# Patient Record
Sex: MALE | Race: WHITE | URBAN - METROPOLITAN AREA
[De-identification: names, ages, dates, MRNs, and addresses within clinical notes are randomized per-mention and may not be internally consistent; named-entity substitution may affect disease eponyms.]

---

## 2017-05-23 ENCOUNTER — OFFICE VISIT (OUTPATIENT)
Dept: INTERNAL MEDICINE CLINIC | Age: 72
End: 2017-05-23

## 2017-05-23 VITALS
WEIGHT: 239.8 LBS | HEIGHT: 69 IN | BODY MASS INDEX: 35.52 KG/M2 | TEMPERATURE: 97.7 F | SYSTOLIC BLOOD PRESSURE: 118 MMHG | RESPIRATION RATE: 18 BRPM | HEART RATE: 68 BPM | DIASTOLIC BLOOD PRESSURE: 71 MMHG | OXYGEN SATURATION: 96 %

## 2017-05-23 DIAGNOSIS — Z13.39 SCREENING FOR ALCOHOLISM: ICD-10-CM

## 2017-05-23 DIAGNOSIS — Z12.11 SCREEN FOR COLON CANCER: ICD-10-CM

## 2017-05-23 DIAGNOSIS — Z00.00 ROUTINE GENERAL MEDICAL EXAMINATION AT A HEALTH CARE FACILITY: Primary | ICD-10-CM

## 2017-05-23 DIAGNOSIS — G47.30 SLEEP APNEA, UNSPECIFIED TYPE: ICD-10-CM

## 2017-05-23 DIAGNOSIS — I10 ESSENTIAL HYPERTENSION: Chronic | ICD-10-CM

## 2017-05-23 DIAGNOSIS — R40.0 DAYTIME SOMNOLENCE: ICD-10-CM

## 2017-05-23 DIAGNOSIS — E78.00 HYPERCHOLESTEREMIA: Chronic | ICD-10-CM

## 2017-05-23 PROBLEM — Z71.89 ADVANCE DIRECTIVE DISCUSSED WITH PATIENT: Status: ACTIVE | Noted: 2017-05-23

## 2017-05-23 NOTE — MR AVS SNAPSHOT
Visit Information Date & Time Provider Department Dept. Phone Encounter #  
 5/23/2017  4:00 PM [address] [phone] [id number] Follow-up Instructions Return in about 3 months (around 8/23/2017) for annual CPE, cholesterol, htn. Upcoming Health Maintenance Date Due  
 GLAUCOMA SCREENING Q2Y 5/22/2012 COLONOSCOPY 10/13/2016 MEDICARE YEARLY EXAM 4/7/2017 DTaP/Tdap/Td series (1 - Tdap) 10/24/2018* INFLUENZA AGE 9 TO ADULT 8/1/2017 *Topic was postponed. The date shown is not the original due date. Allergies as of 5/23/2017  Review Complete On: 5/23/2017 By: Paola Nguyen MD  
 No Known Allergies Current Immunizations  Reviewed on 10/3/2016 Name Date Influenza High Dose Vaccine PF 10/3/2016  5:20 PM, 12/21/2014 Influenza Vaccine 2/5/2014  4:48 PM  
 Influenza Vaccine Split 10/9/2012 Pneumococcal Conjugate (PCV-13) 10/3/2016  5:21 PM  
 Pneumococcal Polysaccharide (PPSV-23) 2/5/2014  4:39 PM  
 Td 7/5/2015 Zoster Vaccine, Live 12/22/2014 Not reviewed this visit You Were Diagnosed With   
  
 Codes Comments Routine general medical examination at a health care facility    -  Primary ICD-10-CM: Z00.00 ICD-9-CM: V70.0 Essential hypertension     ICD-10-CM: I10 
ICD-9-CM: 401.9 Hypercholesteremia     ICD-10-CM: E78.00 ICD-9-CM: 272.0 Sleep apnea, unspecified type     ICD-10-CM: G47.30 ICD-9-CM: 780.57 Daytime somnolence     ICD-10-CM: R40.0 ICD-9-CM: 780.54 Screen for colon cancer     ICD-10-CM: Z12.11 ICD-9-CM: V76.51 Screening for alcoholism     ICD-10-CM: Z13.89 ICD-9-CM: V79.1 Vitals BP Pulse Temp Resp Height(growth percentile) Weight(growth percentile) 118/71 (BP 1 Location: Right arm, BP Patient Position: Sitting) 68 97.7 °F (36.5 °C) (Oral) 18 5' 9\" (1.753 m) 239 lb 12.8 oz (108.8 kg) SpO2 BMI Smoking Status 96% 35.41 kg/m2 Former Smoker Vitals History BMI and BSA Data Body Mass Index Body Surface Area  
 35.41 kg/m 2 2.3 m 2 Preferred Pharmacy Pharmacy Name Phone [facility] [address], [address] Your Updated Medication List  
  
   
This list is accurate as of: 5/23/17  4:53 PM.  Always use your most recent med list.  
  
  
  
  
 acetaminophen 325 mg tablet Commonly known as:  TYLENOL  
650 mg.  
  
 aspirin delayed-release 81 mg tablet Take  by mouth daily. escitalopram oxalate 20 mg tablet Commonly known as:  Oletha Dubs Take 1 Tab by mouth daily. metoprolol tartrate 50 mg tablet Commonly known as:  LOPRESSOR Take 1 Tab by mouth two (2) times a day. Indications: MYOCARDIAL INFARCTION  
  
 NITROSTAT 0.4 mg SL tablet Generic drug:  nitroglycerin  
  
 raNITIdine 150 mg tablet Commonly known as:  ZANTAC Take 150 mg by mouth as needed for Indigestion. rosuvastatin 10 mg tablet Commonly known as:  CRESTOR  
TAKE 1 TABLET DAILY  
  
 tadalafil 20 mg tablet Commonly known as:  CIALIS Take 1 tablet by mouth as needed. temazepam 15 mg capsule Commonly known as:  RESTORIL 15 mg.  
  
 topiramate 50 mg tablet Commonly known as:  TOPAMAX Take 1 Tab by mouth two (2) times a day. ZETIA 10 mg tablet Generic drug:  ezetimibe TAKE 1 TABLET DAILY We Performed the Following REFERRAL TO GASTROENTEROLOGY [UFT11 Custom] Comments:  
 Due for follow-up colonoscopy REFERRAL TO SLEEP STUDIES [REF99 Custom] Comments:  
 [facility] [address] [place] [address] Follow-up Instructions Return in about 3 months (around 8/23/2017) for annual CPE, cholesterol, htn. To-Do List   
 05/23/2017 Lab:  LIPID PANEL   
  
 05/23/2017 Lab:  METABOLIC PANEL, COMPREHENSIVE Referral Information Referral ID Referred By Referred To [id number] Jeremy Mack 3. Not Available Visits Status Start Date End Date 1 New Request 5/23/17 5/23/18 If your referral has a status of pending review or denied, additional information will be sent to support the outcome of this decision. Referral ID Referred By Referred To  
 [id number] MD [address]  
   [address] [place] [address] Phone: [phone] Fax: [phone] Visits Status Start Date End Date 1 New Request 5/23/17 5/23/18 If your referral has a status of pending review or denied, additional information will be sent to support the outcome of this decision. Patient Instructions Schedule of Personalized Health Plan (Provide Copy to Patient) The best way to stay healthy is to live a healthy lifestyle. A healthy lifestyle includes regular exercise, eating a well-balanced diet, keeping a healthy weight and not smoking. Regular physical exams and screening tests are another important way to take care of yourself. Preventive exams provided by health care providers can find health problems early when treatment works best and can keep you from getting certain diseases or illnesses. Preventive services include exams, lab tests, screenings, shots, monitoring and information to help you take care of your own health. All people over 65 should have a pneumonia shot. Pneumonia shots are usually only needed once in a lifetime unless your doctor decides differently. All people over 65 should have a yearly flu shot. People over 65 are at medium to high risk for Hepatitis B. Three shots are needed for complete protection. In addition to your physical exam, some screening tests are recommended: 
 
Bone mass measurement (dexa scan) is recommended every two years if you have certain risk factors, such as personal history of vertebral fracture or chronic steroid medication use Diabetes Mellitus screening is recommended every year. Glaucoma is an eye disease caused by high pressure in the eye. An eye exam is recommended every year. Cardiovascular screening tests that check your cholesterol and other blood fat (lipid) levels are recommended every five years. Colorectal Cancer screening tests help to find pre-cancerous polyps (growths in the colon) so they can be removed before they turn into cancer. Tests ordered for screening depend on your personal and family history risk factors. Screening for Prostate Cancer is recommended yearly with a digital rectal exam and/or a PSA test 
 
Here is a list of your current Health Maintenance items with a due date: 
Health Maintenance Topic Date Due  GLAUCOMA SCREENING Q2Y  05/22/2012  COLONOSCOPY  10/13/2016  MEDICARE YEARLY EXAM  04/07/2017  
 DTaP/Tdap/Td series (1 - Tdap) 10/24/2018 (Originally 7/6/2015)  INFLUENZA AGE 9 TO ADULT  08/01/2017  Hepatitis C Screening  Completed  ZOSTER VACCINE AGE 60>  Completed  Pneumococcal 65+ Low/Medium Risk  Completed [facility]! Dear Belinda Lambert: Thank you for requesting a 1CLICK account. Our records indicate that you already have an active 1CLICK account. You can access your account anytime at [email or web address] Did you know that you can access your hospital and ER discharge instructions at any time in 1CLICK? You can also review all of your test results from your hospital stay or ER visit. Additional Information If you have questions, please visit the Frequently Asked Questions section of the 1CLICK website at [email or web address] Remember, 1CLICK is NOT to be used for urgent needs. For medical emergencies, dial 911. Now available from your iPhone and Android! Please provide this summary of care documentation to your next provider. Your primary care clinician is listed as [facility]. If you have any questions after today's visit, please call [phone].

## 2017-05-23 NOTE — PROGRESS NOTES
HISTORY OF PRESENT ILLNESS  Hilbert Koyanagi is a 81 y.o. male. Visit Vitals    /71 (BP 1 Location: Right arm, BP Patient Position: Sitting)    Pulse 68    Temp 97.7 °F (36.5 °C) (Oral)    Resp 18    Ht 5' 9\" (1.753 m)    Wt 239 lb 12.8 oz (108.8 kg)    SpO2 96%    BMI 35.41 kg/m2       HPI Comments: Cardiology thought he may have had an anginal attack recently. He had an Xray but not a stress test. Pt feels it was not angina. Chest Pain (Angina)    The history is provided by the patient (see comments). This is a new problem. The problem has been resolved. The problem occurs rarely. The pain is associated with normal activity. Pertinent negatives include no orthopnea and no palpitations. Procedural history includes cardiac catheterization. Depression   The history is provided by the patient. This is a chronic problem. The current episode started more than 1 week ago. The problem occurs daily. The problem has been gradually improving. The symptoms are relieved by medications. Treatments tried: citalapram. The treatment provided moderate relief. Review of Systems   Constitutional: Negative. Cardiovascular: Negative for palpitations and orthopnea. Psychiatric/Behavioral: Positive for depression. Physical Exam   Constitutional: He is oriented to person, place, and time. He appears well-developed and well-nourished. No distress. Cardiovascular: Normal rate and regular rhythm. Pulmonary/Chest: Effort normal and breath sounds normal.   Musculoskeletal: He exhibits no edema. Neurological: He is alert and oriented to person, place, and time. Skin: Skin is warm and dry. He is not diaphoretic. Psychiatric: He has a normal mood and affect. His behavior is normal. Judgment and thought content normal.   H/o depression sxs. States he is feeling well at this point. We did discuss tapering off meds. He opts to stay where he is. Nursing note and vitals reviewed.       ASSESSMENT and PLAN ICD-10-CM ICD-9-CM                   Essential hypertension K01 254.2 METABOLIC PANEL, COMPREHENSIVE    Hypercholesteremia E78.00 272.0 LIPID PANEL    Sleep apnea, unspecified type G47.30 780.57 REFERRAL TO SLEEP STUDIES    Daytime somnolence R40.0 780.54 REFERRAL TO SLEEP STUDIES    Screen for colon cancer Z12.11 V76.51 REFERRAL TO GASTROENTEROLOGY       BP looks good    Needs updated sleep study    Due for colonoscopy update    Discussed weight, lifestyle, diet and exercise    F/u summer for annual CPE                  The following is a separate encounter visit:      This is an Initial Medicare Annual Wellness Exam (AWV) (Performed 12 months after IPPE or effective date of Medicare Part B enrollment, Once in a lifetime)    I have reviewed the patient's medical history in detail and updated the computerized patient record. History     Past Medical History:   Diagnosis Date    Anxiety     ASCVD (arteriosclerotic cardiovascular disease)     Benign essential tremor     BPH (benign prostatic hyperplasia)     Depression     Gross hematuria     Hypercholesterolemia     Hypertension     MI (myocardial infarction) ([address] 2005    Sleep apnea       Past Surgical History:   Procedure Laterality Date    HX COLONOSCOPY  10/17/11    5 yr f/u, [facility]  5/24/16    LIMA to LAD, SVG OM-LPLV, 3 v total    HX CORONARY STENT PLACEMENT  2005    X2 , Dr. Regina Falk  2005    HX UROLOGICAL  3/28/13    Cystoscopy, Retrograde Pyelogram, Bladder Biopsy     HX VASECTOMY       Current Outpatient Prescriptions   Medication Sig Dispense Refill    topiramate (TOPAMAX) 50 mg tablet Take 1 Tab by mouth two (2) times a day. 180 Tab 5    ZETIA 10 mg tablet TAKE 1 TABLET DAILY 90 Tab 3    rosuvastatin (CRESTOR) 10 mg tablet TAKE 1 TABLET DAILY 90 Tab 3    escitalopram oxalate (LEXAPRO) 20 mg tablet Take 1 Tab by mouth daily.  90 Tab 5    metoprolol tartrate (LOPRESSOR) 50 mg tablet Take 1 Tab by mouth two (2) times a day. Indications: MYOCARDIAL INFARCTION 180 Tab 5    NITROSTAT 0.4 mg SL tablet       acetaminophen (TYLENOL) 325 mg tablet 650 mg.      ranitidine (ZANTAC) 150 mg tablet Take 150 mg by mouth as needed for Indigestion.  tadalafil (CIALIS) 20 mg tablet Take 1 tablet by mouth as needed. 8 tablet 5    aspirin delayed-release 81 mg tablet Take  by mouth daily.  temazepam (RESTORIL) 15 mg capsule 15 mg. No Known Allergies  Family History   Problem Relation Age of Onset    Cancer Mother      Oral    Cancer Maternal Grandfather      Renal Cell    Colon Cancer Paternal Aunt      Social History   Substance Use Topics    Smoking status: Former Smoker     Packs/day: 1.00     Years: 28.00     Types: Cigarettes     Quit date: 1/1/1989    Smokeless tobacco: Never Used    Alcohol use 7.0 oz/week     14 Glasses of wine per week     Patient Active Problem List   Diagnosis Code    Prostate enlargement N40.0    Hypercholesteremia E78.00    Depression F32.9    Gross hematuria R31.0    Hypertrophy of prostate with urinary obstruction and other lower urinary tract symptoms (LUTS) N40.1    Dysuria R30.0    Essential hypertension I10    Coronary artery disease involving native heart without angina pectoris I25.10    Advance directive discussed with patient Z71.89         Depression Risk Factor Screening:   No flowsheet data found. Alcohol Risk Factor Screening: On any occasion during the past 3 months, have you had more than 4 drinks containing alcohol? Yes    Do you average more than 14 drinks per week? Yes    Functional Ability and Level of Safety:     Hearing Loss   moderate, sensorineural, bilat. Wears bilat hearing aids    Activities of Daily Living   Self-care. Requires assistance with: no ADLs    Fall Risk     Fall Risk Assessment, last 12 mths 5/23/2017   Able to walk? Yes   Fall in past 12 months?  No     Abuse Screen   Patient is not abused    Review of Systems   A comprehensive review of systems was negative except for that written in the HPI. Physical Examination     No exam data present    Evaluation of Cognitive Function:  Mood/affect:  happy  Appearance: age appropriate  Family member/caregiver input: self    No exam performed today, not indicated. [place] and ambulates easily  3/3 objects remembered    Patient Care Team:  Cristiane Cho MD as PCP - General (Internal Medicine)  Leland Carver MD as Consulting Provider  Leann Kennedy MD as Consulting Provider (Gastroenterology)  Landon Herrmann MD as Consulting Provider (Surgery)    Advice/Referrals/Counseling   Education and counseling provided:  Are appropriate based on today's review and evaluation      Assessment/Plan       ICD-10-CM ICD-9-CM    1. Routine general medical examination at a health care facility Z00.00 V70.0    2.  Screening for alcoholism Z13.89 V79.1

## 2017-05-23 NOTE — PROGRESS NOTES
Devra Schilder is a 81 y.o. male presents in office to follow-up on chest wall pain and depression. Health Maintenance Due   Topic Date Due    GLAUCOMA SCREENING Q2Y  05/22/2012    COLONOSCOPY  10/13/2016    MEDICARE YEARLY EXAM  04/07/2017       1. Have you been to the ER, urgent care clinic since your last visit? Hospitalized since your last visit? No    2. Have you seen or consulted any other health care providers outside of the [address] [place] [address] since your last visit? Include any pap smears or colon screening.  No

## 2017-05-23 NOTE — PATIENT INSTRUCTIONS
Schedule of Personalized Health Plan  (Provide Copy to Patient)  The best way to stay healthy is to live a healthy lifestyle. A healthy lifestyle includes regular exercise, eating a well-balanced diet, keeping a healthy weight and not smoking. Regular physical exams and screening tests are another important way to take care of yourself. Preventive exams provided by health care providers can find health problems early when treatment works best and can keep you from getting certain diseases or illnesses. Preventive services include exams, lab tests, screenings, shots, monitoring and information to help you take care of your own health. All people over 65 should have a pneumonia shot. Pneumonia shots are usually only needed once in a lifetime unless your doctor decides differently. All people over 65 should have a yearly flu shot. People over 65 are at medium to high risk for Hepatitis B. Three shots are needed for complete protection. In addition to your physical exam, some screening tests are recommended:    Bone mass measurement (dexa scan) is recommended every two years if you have certain risk factors, such as personal history of vertebral fracture or chronic steroid medication use    Diabetes Mellitus screening is recommended every year. Glaucoma is an eye disease caused by high pressure in the eye. An eye exam is recommended every year. Cardiovascular screening tests that check your cholesterol and other blood fat (lipid) levels are recommended every five years. Colorectal Cancer screening tests help to find pre-cancerous polyps (growths in the colon) so they can be removed before they turn into cancer. Tests ordered for screening depend on your personal and family history risk factors.     Screening for Prostate Cancer is recommended yearly with a digital rectal exam and/or a PSA test    Here is a list of your current Health Maintenance items with a due date:  Health Maintenance Topic Date Due    GLAUCOMA SCREENING Q2Y  05/22/2012    COLONOSCOPY  10/13/2016    MEDICARE YEARLY EXAM  04/07/2017    DTaP/Tdap/Td series (1 - Tdap) 10/24/2018 (Originally 7/6/2015)    INFLUENZA AGE 9 TO ADULT  08/01/2017    Hepatitis C Screening  Completed    ZOSTER VACCINE AGE 60>  Completed    Pneumococcal 65+ Low/Medium Risk  Completed

## 2017-08-15 ENCOUNTER — HOSPITAL ENCOUNTER (OUTPATIENT)
Dept: LAB | Age: 72
Discharge: HOME | End: 2017-08-15
Payer: COMMERCIAL

## 2017-08-15 ENCOUNTER — OFFICE VISIT (OUTPATIENT)
Dept: INTERNAL MEDICINE CLINIC | Age: 72
End: 2017-08-15

## 2017-08-15 VITALS
SYSTOLIC BLOOD PRESSURE: 125 MMHG | DIASTOLIC BLOOD PRESSURE: 62 MMHG | WEIGHT: 245.4 LBS | BODY MASS INDEX: 36.35 KG/M2 | HEART RATE: 78 BPM | HEIGHT: 69 IN | TEMPERATURE: 98.3 F | RESPIRATION RATE: 18 BRPM | OXYGEN SATURATION: 95 %

## 2017-08-15 DIAGNOSIS — Z00.00 ROUTINE GENERAL MEDICAL EXAMINATION AT A HEALTH CARE FACILITY: ICD-10-CM

## 2017-08-15 DIAGNOSIS — Z00.00 ROUTINE GENERAL MEDICAL EXAMINATION AT A HEALTH CARE FACILITY: Primary | ICD-10-CM

## 2017-08-15 DIAGNOSIS — E78.00 HYPERCHOLESTEREMIA: Chronic | ICD-10-CM

## 2017-08-15 DIAGNOSIS — F32.A DEPRESSION, UNSPECIFIED DEPRESSION TYPE: Chronic | ICD-10-CM

## 2017-08-15 DIAGNOSIS — I25.810 CORONARY ARTERY DISEASE INVOLVING CORONARY BYPASS GRAFT OF NATIVE HEART WITHOUT ANGINA PECTORIS: Chronic | ICD-10-CM

## 2017-08-15 DIAGNOSIS — I10 ESSENTIAL HYPERTENSION: Chronic | ICD-10-CM

## 2017-08-15 LAB
ALBUMIN SERPL BCP-MCNC: 3.7 G/DL (ref 3.4–5)
ALBUMIN/GLOB SERPL: 1.3 {RATIO} (ref 0.8–1.7)
ALP SERPL-CCNC: 85 U/L (ref 45–117)
ALT SERPL-CCNC: 37 U/L (ref 16–61)
ANION GAP BLD CALC-SCNC: 9 MMOL/L (ref 3–18)
APPEARANCE UR: CLEAR
AST SERPL W P-5'-P-CCNC: 21 U/L (ref 15–37)
BACTERIA URNS QL MICRO: NEGATIVE /HPF
BASOPHILS # BLD: 0 K/UL (ref 0–0.06)
BASOPHILS NFR BLD: 0 % (ref 0–2)
BILIRUB SERPL-MCNC: 0.6 MG/DL (ref 0.2–1)
BILIRUB UR QL: NEGATIVE
BUN SERPL-MCNC: 24 MG/DL (ref 7–18)
BUN/CREAT SERPL: 23 (ref 12–20)
CALCIUM SERPL-MCNC: 8.7 MG/DL (ref 8.5–10.1)
CHLORIDE SERPL-SCNC: 108 MMOL/L (ref 100–108)
CHOLEST SERPL-MCNC: 169 MG/DL
CO2 SERPL-SCNC: 24 MMOL/L (ref 21–32)
COLOR UR: ABNORMAL
CREAT SERPL-MCNC: 1.06 MG/DL (ref 0.6–1.3)
DIFFERENTIAL METHOD BLD: NORMAL
EOSINOPHIL # BLD: 0.1 K/UL (ref 0–0.4)
EOSINOPHIL NFR BLD: 2 % (ref 0–5)
EPITH CASTS URNS QL MICRO: ABNORMAL /LPF (ref 0–5)
ERYTHROCYTE [DISTWIDTH] IN BLOOD BY AUTOMATED COUNT: 12.5 % (ref 11.6–14.5)
GLOBULIN SER CALC-MCNC: 2.9 G/DL (ref 2–4)
GLUCOSE SERPL-MCNC: 121 MG/DL (ref 74–99)
GLUCOSE UR STRIP.AUTO-MCNC: NEGATIVE MG/DL
HCT VFR BLD AUTO: 43 % (ref 36–48)
HDLC SERPL-MCNC: 55 MG/DL (ref 40–60)
HDLC SERPL: 3.1 {RATIO} (ref 0–5)
HGB BLD-MCNC: 14.9 G/DL (ref 13–16)
HGB UR QL STRIP: NEGATIVE
KETONES UR QL STRIP.AUTO: NEGATIVE MG/DL
LDLC SERPL CALC-MCNC: 66.4 MG/DL (ref 0–100)
LEUKOCYTE ESTERASE UR QL STRIP.AUTO: NEGATIVE
LIPID PROFILE,FLP: ABNORMAL
LYMPHOCYTES # BLD: 1.6 K/UL (ref 0.9–3.6)
LYMPHOCYTES NFR BLD: 27 % (ref 21–52)
MCH RBC QN AUTO: 30.9 PG (ref 24–34)
MCHC RBC AUTO-ENTMCNC: 34.7 G/DL (ref 31–37)
MCV RBC AUTO: 89.2 FL (ref 74–97)
MONOCYTES # BLD: 0.5 K/UL (ref 0.05–1.2)
MONOCYTES NFR BLD: 8 % (ref 3–10)
MUCOUS THREADS URNS QL MICRO: ABNORMAL /LPF
NEUTS SEG # BLD: 3.8 K/UL (ref 1.8–8)
NEUTS SEG NFR BLD: 63 % (ref 40–73)
NITRITE UR QL STRIP.AUTO: NEGATIVE
PH UR STRIP: 6 [PH] (ref 5–8)
PLATELET # BLD AUTO: 210 K/UL (ref 135–420)
PMV BLD AUTO: 10.4 FL (ref 9.2–11.8)
POTASSIUM SERPL-SCNC: 4.2 MMOL/L (ref 3.5–5.5)
PROT SERPL-MCNC: 6.6 G/DL (ref 6.4–8.2)
PROT UR STRIP-MCNC: ABNORMAL MG/DL
RBC # BLD AUTO: 4.82 M/UL (ref 4.7–5.5)
RBC #/AREA URNS HPF: ABNORMAL /HPF (ref 0–5)
SODIUM SERPL-SCNC: 141 MMOL/L (ref 136–145)
SP GR UR REFRACTOMETRY: >1.03 (ref 1–1.03)
TRIGL SERPL-MCNC: 238 MG/DL (ref ?–150)
UROBILINOGEN UR QL STRIP.AUTO: 1 EU/DL (ref 0.2–1)
VLDLC SERPL CALC-MCNC: 47.6 MG/DL
WBC # BLD AUTO: 5.9 K/UL (ref 4.6–13.2)
WBC URNS QL MICRO: ABNORMAL /HPF (ref 0–4)

## 2017-08-15 PROCEDURE — 36415 COLL VENOUS BLD VENIPUNCTURE: CPT | Performed by: INTERNAL MEDICINE

## 2017-08-15 PROCEDURE — 80053 COMPREHEN METABOLIC PANEL: CPT | Performed by: INTERNAL MEDICINE

## 2017-08-15 PROCEDURE — 80061 LIPID PANEL: CPT | Performed by: INTERNAL MEDICINE

## 2017-08-15 PROCEDURE — 81001 URINALYSIS AUTO W/SCOPE: CPT | Performed by: INTERNAL MEDICINE

## 2017-08-15 PROCEDURE — 85025 COMPLETE CBC W/AUTO DIFF WBC: CPT | Performed by: INTERNAL MEDICINE

## 2017-08-15 NOTE — MR AVS SNAPSHOT
Visit Information Date & Time Provider Department Dept. Phone Encounter #  
 8/15/2017  4:00 PM Shelby Baltazar [facility] [phone] [id number] Follow-up Instructions Return in about 6 months (around 2/15/2018) for htn, cholesterol. Upcoming Health Maintenance Date Due INFLUENZA AGE 9 TO ADULT 9/15/2017* GLAUCOMA SCREENING Q2Y 11/13/2017* COLONOSCOPY 11/13/2017* DTaP/Tdap/Td series (1 - Tdap) 10/24/2018* MEDICARE YEARLY EXAM 5/24/2018 *Topic was postponed. The date shown is not the original due date. Allergies as of 8/15/2017  Review Complete On: 8/15/2017 By: Marin Cabrera LPN No Known Allergies Current Immunizations  Reviewed on 10/3/2016 Name Date Influenza High Dose Vaccine PF 10/3/2016  5:20 PM, 12/21/2014 Influenza Vaccine 2/5/2014  4:48 PM  
 Influenza Vaccine Split 10/9/2012 Pneumococcal Conjugate (PCV-13) 10/3/2016  5:21 PM  
 Pneumococcal Polysaccharide (PPSV-23) 2/5/2014  4:39 PM  
 Td 7/5/2015 Zoster Vaccine, Live 12/22/2014 Not reviewed this visit You Were Diagnosed With   
  
 Codes Comments Routine general medical examination at a health care facility    -  Primary ICD-10-CM: Z00.00 ICD-9-CM: V70.0 Hypercholesteremia     ICD-10-CM: E78.00 ICD-9-CM: 272.0 Essential hypertension     ICD-10-CM: I10 
ICD-9-CM: 401.9 Coronary artery disease involving coronary bypass graft of native heart without angina pectoris     ICD-10-CM: I25.810 ICD-9-CM: 414.05 Depression, unspecified depression type     ICD-10-CM: F32.9 ICD-9-CM: 294 Vitals BP Pulse Temp Resp Height(growth percentile) Weight(growth percentile) 125/62 78 98.3 °F (36.8 °C) (Oral) 18 5' 9\" (1.753 m) 245 lb 6.4 oz (111.3 kg) SpO2 BMI Smoking Status 95% 36.24 kg/m2 Former Smoker BMI and BSA Data  Body Mass Index Body Surface Area  
 36.24 kg/m 2 2.33 m 2  
  
  
 Preferred Pharmacy Pharmacy Name Phone [facility] [address], [address] Your Updated Medication List  
  
   
This list is accurate as of: 8/15/17  4:39 PM.  Always use your most recent med list.  
  
  
  
  
 acetaminophen 325 mg tablet Commonly known as:  TYLENOL  
650 mg.  
  
 aspirin delayed-release 81 mg tablet Take  by mouth daily. escitalopram oxalate 20 mg tablet Commonly known as:  Tramaine Orts Take 1 Tab by mouth daily. metoprolol tartrate 50 mg tablet Commonly known as:  LOPRESSOR  
TAKE ONE TABLET BY MOUTH TWICE DAILY NITROSTAT 0.4 mg SL tablet Generic drug:  nitroglycerin  
  
 raNITIdine 150 mg tablet Commonly known as:  ZANTAC Take 150 mg by mouth as needed for Indigestion. rosuvastatin 10 mg tablet Commonly known as:  CRESTOR  
TAKE 1 TABLET DAILY  
  
 tadalafil 20 mg tablet Commonly known as:  CIALIS Take 1 tablet by mouth as needed. temazepam 15 mg capsule Commonly known as:  RESTORIL 15 mg.  
  
 topiramate 50 mg tablet Commonly known as:  TOPAMAX Take 1 Tab by mouth two (2) times a day. ZETIA 10 mg tablet Generic drug:  ezetimibe TAKE 1 TABLET DAILY Follow-up Instructions Return in about 6 months (around 2/15/2018) for htn, cholesterol. To-Do List   
 08/15/2017 Lab:  CBC WITH AUTOMATED DIFF   
  
 08/15/2017 Lab:  LIPID PANEL   
  
 08/15/2017 Lab:  METABOLIC PANEL, COMPREHENSIVE   
  
 08/15/2017 Lab:  URINALYSIS W/ RFLX MICROSCOPIC Introducing [address]! Dear Karen Gama: Thank you for requesting a TurningArt account. Our records indicate that you already have an active TurningArt account. You can access your account anytime at [email or web address] Did you know that you can access your hospital and ER discharge instructions at any time in TurningArt?   You can also review all of your test results from your hospital stay or ER visit. Additional Information If you have questions, please visit the Frequently Asked Questions section of the [employer] website at [email or web address] Remember, [employer] is NOT to be used for urgent needs. For medical emergencies, dial 911. Now available from your iPhone and Android! Please provide this summary of care documentation to your next provider. Your primary care clinician is listed as [facility]. If you have any questions after today's visit, please call [phone].

## 2017-08-15 NOTE — PROGRESS NOTES
Chief Complaint   Patient presents with    Complete Physical       Pt preferred language for health care discussion is English. Is someone accompanying this pt? no    Is the patient using any DME equipment during OV? no    Depression Screening:  PHQ over the last two weeks 8/15/2017   PHQ Not Done Active Diagnosis of Depression or Bipolar Disorder   Little interest or pleasure in doing things Not at all   Feeling down, depressed or hopeless Not at all   Total Score PHQ 2 0     Learning Assessment:  Learning Assessment 9/10/2015   PRIMARY LEARNER Patient   HIGHEST LEVEL OF EDUCATION - PRIMARY LEARNER  > 4 YEARS OF COLLEGE   BARRIERS PRIMARY LEARNER NONE   PRIMARY LANGUAGE ENGLISH   LEARNER PREFERENCE PRIMARY VIDEOS   ANSWERED BY pateint   RELATIONSHIP SELF       Abuse Screening:  Abuse Screening Questionnaire 9/10/2015   Do you ever feel afraid of your partner? N   Are you in a relationship with someone who physically or mentally threatens you? N   Is it safe for you to go home? Y       Fall Risk  Fall Risk Assessment, last 12 mths 8/15/2017 5/23/2017 11/23/2016 10/3/2016 7/6/2016 6/6/2016 4/6/2016   Able to walk? Yes Yes Yes Yes Yes Yes No   Fall in past 12 months? No No No - No No -   Fall with injury? - - - - - - -   Number of falls in past 12 months - - - - - - -         Health Maintenance reviewed and discussed per provider. Yes    Pt is due for Colonoscopy (appointment for tomorrow for the pre-consultation with GI on file), Eye exam (referral placed). Please order/place referral if appropriate. Pt currently taking Antiplatelet therapy? ASA      Advance Directive:  1. Do you have an advance directive in place? Patient Reply:yes, not on file    2. If not, would you like material regarding how to put one in place? Patient Reply: no    Coordination of Care:  1. Have you been to the ER, urgent care clinic since your last visit? Hospitalized since your last visit? no    2.  Have you seen or consulted any other health care providers outside of the [address] [place] [address] since your last visit? Include any pap smears or colon screening.  Dr. Joseline Ramon

## 2017-08-15 NOTE — PROGRESS NOTES
HISTORY OF PRESENT ILLNESS  Rochelle Campuzano is a 81 y.o. male. Visit Vitals    /62    Pulse 78    Temp 98.3 °F (36.8 °C) (Oral)    Resp 18    Ht 5' 9\" (1.753 m)    Wt 245 lb 6.4 oz (111.3 kg)    SpO2 95%    BMI 36.24 kg/m2       HPI Comments: Saw his cardiologist 6 months ago  No new chest pains or SOB    Complete Physical   The history is provided by the patient. This is a new problem. Review of Systems   HENT: Negative. Eyes: Negative. Respiratory: Negative. Cardiovascular: Negative. Physical Exam   Constitutional: He is oriented to person, place, and time. He appears well-developed and well-nourished. He does not have a sickly appearance. He does not appear ill. No distress. HENT:   Head: Normocephalic and atraumatic. Right Ear: External ear and ear canal normal. No drainage or tenderness. No mastoid tenderness. Tympanic membrane is not erythematous. Left Ear: External ear and ear canal normal. No drainage or tenderness. No mastoid tenderness. Tympanic membrane is not erythematous. Nose: Nose normal. No mucosal edema, rhinorrhea or sinus tenderness. No epistaxis. Right sinus exhibits no maxillary sinus tenderness and no frontal sinus tenderness. Left sinus exhibits no maxillary sinus tenderness and no frontal sinus tenderness. Mouth/Throat: Uvula is midline, oropharynx is clear and moist and mucous membranes are normal. No oropharyngeal exudate, posterior oropharyngeal edema or posterior oropharyngeal erythema. Eyes: Conjunctivae and EOM are normal. Pupils are equal, round, and reactive to light. Right eye exhibits no discharge. Left eye exhibits no discharge. No scleral icterus. Neck: Normal range of motion and phonation normal. Neck supple. No JVD present. Carotid bruit is not present. No tracheal deviation and no edema present. No thyroid mass and no thyromegaly present. Cardiovascular: Normal rate, regular rhythm, normal heart sounds and intact distal pulses. Exam reveals no gallop and no friction rub. No murmur heard. Pulmonary/Chest: Effort normal and breath sounds normal. No respiratory distress. He has no wheezes. He has no rales. He exhibits no tenderness. Abdominal: Soft. Bowel sounds are normal. He exhibits no distension, no abdominal bruit, no pulsatile midline mass and no mass. There is no hepatosplenomegaly. There is no tenderness. There is no rebound, no guarding and no CVA tenderness. Hernia confirmed negative in the right inguinal area and confirmed negative in the left inguinal area. Genitourinary: Penis normal. Right testis shows no mass and no tenderness. Left testis shows no mass and no tenderness. Circumcised. Musculoskeletal: Normal range of motion. He exhibits no edema or tenderness. Lymphadenopathy:     He has no cervical adenopathy. Neurological: He is alert and oriented to person, place, and time. He has normal reflexes. He displays no tremor. No cranial nerve deficit. He exhibits normal muscle tone. Coordination normal.        Skin: Skin is warm and dry. No rash noted. He is not diaphoretic. No cyanosis or erythema. Nails show no clubbing. Psychiatric: He has a normal mood and affect. His behavior is normal. Judgment and thought content normal.   Nursing note and vitals reviewed. ASSESSMENT and PLAN    ICD-10-CM ICD-9-CM    1. Routine general medical examination at a health care facility Z00.00 V70.0 CBC WITH AUTOMATED DIFF      METABOLIC PANEL, COMPREHENSIVE      LIPID PANEL      URINALYSIS W/ RFLX MICROSCOPIC   2. Hypercholesteremia E78.00 272.0 LIPID PANEL   3. Essential hypertension X29 725.3 METABOLIC PANEL, COMPREHENSIVE   4. Coronary artery disease involving coronary bypass graft of native heart without angina pectoris I25.810 414.05        BP controlled    Discussed weight.  He regained some    Update lab    F/u 6 months

## 2017-10-15 DIAGNOSIS — Z76.0 MEDICATION REFILL: ICD-10-CM

## 2017-10-15 RX ORDER — ROSUVASTATIN CALCIUM 10 MG/1
TABLET, COATED ORAL
Qty: 90 TAB | Refills: 3 | Status: SHIPPED | OUTPATIENT
Start: 2017-10-15 | End: 2018-10-10 | Stop reason: SDUPTHER

## 2017-10-30 ENCOUNTER — TELEPHONE (OUTPATIENT)
Dept: INTERNAL MEDICINE CLINIC | Age: 72
End: 2017-10-30

## 2017-11-06 DIAGNOSIS — Z76.0 MEDICATION REFILL: ICD-10-CM

## 2017-11-06 RX ORDER — ESCITALOPRAM OXALATE 20 MG/1
TABLET ORAL
Qty: 90 TAB | Refills: 5 | Status: SHIPPED | OUTPATIENT
Start: 2017-11-06 | End: 2019-02-22 | Stop reason: SDUPTHER

## 2018-02-15 ENCOUNTER — OFFICE VISIT (OUTPATIENT)
Dept: INTERNAL MEDICINE CLINIC | Age: 73
End: 2018-02-15

## 2018-02-15 VITALS
DIASTOLIC BLOOD PRESSURE: 76 MMHG | SYSTOLIC BLOOD PRESSURE: 131 MMHG | RESPIRATION RATE: 14 BRPM | HEART RATE: 68 BPM | OXYGEN SATURATION: 93 % | TEMPERATURE: 98.3 F | BODY MASS INDEX: 35.99 KG/M2 | HEIGHT: 69 IN | WEIGHT: 243 LBS

## 2018-02-15 DIAGNOSIS — F32.A DEPRESSION, UNSPECIFIED DEPRESSION TYPE: Chronic | ICD-10-CM

## 2018-02-15 DIAGNOSIS — E78.00 HYPERCHOLESTEREMIA: Chronic | ICD-10-CM

## 2018-02-15 DIAGNOSIS — I10 ESSENTIAL HYPERTENSION: Primary | Chronic | ICD-10-CM

## 2018-02-15 NOTE — MR AVS SNAPSHOT
[address] [facility], [place], operated [facility] 
 
 
 [address] [facility] [address] 
[phone] Patient: Cheryl López MRN: [id number] BGV:1/93/2032 Visit Information Date & Time Provider Department Dept. Phone Encounter #  
 2/15/2018  4:00 PM Sole Day, [address] [id number] Follow-up Instructions Return in about 6 months (around 8/15/2018) for annual CPE, htn, cholesterol. Upcoming Health Maintenance Date Due  
 GLAUCOMA SCREENING Q2Y 5/22/2012 COLONOSCOPY 5/16/2018* DTaP/Tdap/Td series (1 - Tdap) 10/24/2018* MEDICARE YEARLY EXAM 5/24/2018 *Topic was postponed. The date shown is not the original due date. Allergies as of 2/15/2018  Review Complete On: 2/15/2018 By: Sole Day MD  
 No Known Allergies Current Immunizations  Reviewed on 10/3/2016 Name Date Influenza High Dose Vaccine PF 10/3/2016  5:20 PM, 12/21/2014 Influenza Vaccine 11/15/2017, 2/5/2014  4:48 PM  
 Influenza Vaccine Split 10/9/2012 Pneumococcal Conjugate (PCV-13) 10/3/2016  5:21 PM  
 Pneumococcal Polysaccharide (PPSV-23) 5/31/2016 12:00 AM, 2/5/2014  4:39 PM  
 Td 7/5/2015 Zoster Vaccine, Live 12/22/2014 Not reviewed this visit You Were Diagnosed With   
  
 Codes Comments Essential hypertension    -  Primary ICD-10-CM: I10 
ICD-9-CM: 401.9 Hypercholesteremia     ICD-10-CM: E78.00 ICD-9-CM: 272.0 Depression, unspecified depression type     ICD-10-CM: F32.9 ICD-9-CM: 103 Vitals BP Pulse Temp Resp Height(growth percentile) Weight(growth percentile) 131/76 (BP 1 Location: Left arm, BP Patient Position: Sitting) 68 98.3 °F (36.8 °C) (Oral) 14 5' 9\" (1.753 m) 243 lb (110.2 kg) SpO2 BMI Smoking Status 93% 35.88 kg/m2 Former Smoker BMI and BSA Data Body Mass Index Body Surface Area  
 35.88 kg/m 2 2.32 m 2 Preferred Pharmacy Pharmacy Name Phone [address] [facility],[address] Davian Toussaint [phone] Your Updated Medication List  
  
   
This list is accurate as of: 2/15/18  5:11 PM.  Always use your most recent med list.  
  
  
  
  
 acetaminophen 325 mg tablet Commonly known as:  TYLENOL  
650 mg.  
  
 aspirin delayed-release 81 mg tablet Take  by mouth daily. escitalopram oxalate 20 mg tablet Commonly known as:  Maggimiguelangel Gonzales TAKE ONE TABLET BY MOUTH ONCE DAILY  
  
 ezetimibe 10 mg tablet Commonly known as:  Pratts Corey TAKE 1 TABLET DAILY  
  
 metoprolol tartrate 50 mg tablet Commonly known as:  LOPRESSOR  
TAKE ONE TABLET BY MOUTH TWICE DAILY NITROSTAT 0.4 mg SL tablet Generic drug:  nitroglycerin  
  
 rosuvastatin 10 mg tablet Commonly known as:  CRESTOR  
TAKE 1 TABLET DAILY  
  
 tadalafil 20 mg tablet Commonly known as:  CIALIS Take 1 tablet by mouth as needed. temazepam 15 mg capsule Commonly known as:  RESTORIL 15 mg. Follow-up Instructions Return in about 6 months (around 8/15/2018) for annual CPE, htn, cholesterol. To-Do List   
 02/15/2018 Lab:  LIPID PANEL   
  
 02/15/2018 Lab:  METABOLIC PANEL, COMPREHENSIVE [facility]! Dear Nydia Nj: Thank you for requesting a [employer]ic[employer] account. Our records indicate that you already have an active Aicent account. You can access your account anytime at [email or web address] Did you know that you can access your hospital and ER discharge instructions at any time in Aicent? You can also review all of your test results from your hospital stay or ER visit. Additional Information If you have questions, please visit the Frequently Asked Questions section of the Aicent website at [email or web address] Remember, Aicent is NOT to be used for urgent needs. For medical emergencies, dial 911. Now available from your iPhone and Android! Please provide this summary of care documentation to your next provider. Your primary care clinician is listed as [facility]. If you have any questions after today's visit, please call [phone].

## 2018-02-15 NOTE — PROGRESS NOTES
Patient presents to the clinic for a follow up on his blood pressure. Flu Shot Requested: received    Depression Screening:  PHQ over the last two weeks 8/15/2017   PHQ Not Done Active Diagnosis of Depression or Bipolar Disorder   Little interest or pleasure in doing things Not at all   Feeling down, depressed or hopeless Not at all   Total Score PHQ 2 0       Learning Assessment:  Learning Assessment 9/10/2015   PRIMARY LEARNER Patient   HIGHEST LEVEL OF EDUCATION - PRIMARY LEARNER  > 4 YEARS OF COLLEGE   BARRIERS PRIMARY LEARNER NONE   PRIMARY LANGUAGE ENGLISH   LEARNER PREFERENCE PRIMARY VIDEOS   ANSWERED BY pateint   RELATIONSHIP SELF       Abuse Screening:  Abuse Screening Questionnaire 9/10/2015   Do you ever feel afraid of your partner? N   Are you in a relationship with someone who physically or mentally threatens you? N   Is it safe for you to go home? Y       Health Maintenance reviewed and discussed per provider: yes     Coordination of Care:    1. Have you been to the ER, urgent care clinic since your last visit? Hospitalized since your last visit? no    2. Have you seen or consulted any other health care providers outside of the [address] [place] [address] since your last visit? Include any pap smears or colon screening.  Dr. Minor Braswell, Sleep Apnea

## 2018-02-15 NOTE — PROGRESS NOTES
HISTORY OF PRESENT ILLNESS  Laura Christianson is a 81 y.o. male. Visit Vitals    /76 (BP 1 Location: Left arm, BP Patient Position: Sitting)    Pulse 68    Temp 98.3 °F (36.8 °C) (Oral)    Resp 14    Ht 5' 9\" (1.753 m)    Wt 243 lb (110.2 kg)    SpO2 93%    BMI 35.88 kg/m2       HPI Comments: BP up a little today compared to usual. He has had three trial this week    Reports no cardiac sxs. Depression sxs are minimal  And doing well on current meds. Hypertension    The history is provided by the patient (see commnet). This is a chronic problem. The current episode started more than 1 week ago. The problem has not changed since onset. Pertinent negatives include no chest pain, no palpitations, no headaches, no dizziness and no shortness of breath. Review of Systems   Constitutional: Negative. Respiratory: Negative for shortness of breath. Cardiovascular: Negative for chest pain and palpitations. Neurological: Negative for dizziness and headaches. Physical Exam   Constitutional: He is oriented to person, place, and time. He appears well-developed and well-nourished. No distress. Cardiovascular: Normal rate. Pulmonary/Chest: Effort normal.   Neurological: He is alert and oriented to person, place, and time. Skin: Skin is warm and dry. He is not diaphoretic. Psychiatric: He has a normal mood and affect. Nursing note and vitals reviewed. ASSESSMENT and PLAN    ICD-10-CM ICD-9-CM    1. Essential hypertension S35 007.5 METABOLIC PANEL, COMPREHENSIVE   2. Hypercholesteremia E78.00 272.0 LIPID PANEL   3. Depression, unspecified depression type F32.9 311        BP controlled    No cardiac sxs    Depression sxs stable on current meds. Discussed BMI/weight, lifestyle, diet and exercise. Discussed effect on blood pressure, blood sugar, and joints especially  Focus on limiting white carbs, portion control, and moving more. Pt will f/u with eye doctor.     We will track down his colonoscopy    F/u 6 months

## 2018-08-15 ENCOUNTER — OFFICE VISIT (OUTPATIENT)
Dept: INTERNAL MEDICINE CLINIC | Age: 73
End: 2018-08-15

## 2018-08-15 VITALS
DIASTOLIC BLOOD PRESSURE: 82 MMHG | HEART RATE: 76 BPM | HEIGHT: 69 IN | SYSTOLIC BLOOD PRESSURE: 141 MMHG | OXYGEN SATURATION: 95 % | BODY MASS INDEX: 36.32 KG/M2 | WEIGHT: 245.2 LBS | RESPIRATION RATE: 18 BRPM | TEMPERATURE: 98 F

## 2018-08-15 DIAGNOSIS — I10 ESSENTIAL HYPERTENSION: Chronic | ICD-10-CM

## 2018-08-15 DIAGNOSIS — G25.0 ESSENTIAL TREMOR: Chronic | ICD-10-CM

## 2018-08-15 DIAGNOSIS — E66.01 SEVERE OBESITY (BMI 35.0-39.9): ICD-10-CM

## 2018-08-15 DIAGNOSIS — Z76.0 MEDICATION REFILL: ICD-10-CM

## 2018-08-15 DIAGNOSIS — I25.810 CORONARY ARTERY DISEASE INVOLVING CORONARY BYPASS GRAFT OF NATIVE HEART WITHOUT ANGINA PECTORIS: Chronic | ICD-10-CM

## 2018-08-15 DIAGNOSIS — E78.00 HYPERCHOLESTEREMIA: Chronic | ICD-10-CM

## 2018-08-15 DIAGNOSIS — Z00.00 ROUTINE GENERAL MEDICAL EXAMINATION AT A HEALTH CARE FACILITY: Primary | ICD-10-CM

## 2018-08-15 RX ORDER — METOPROLOL TARTRATE 50 MG/1
50 TABLET ORAL 2 TIMES DAILY
Qty: 180 TAB | Refills: 4 | Status: SHIPPED | OUTPATIENT
Start: 2018-08-15 | End: 2020-03-12 | Stop reason: SDUPTHER

## 2018-08-15 NOTE — ACP (ADVANCE CARE PLANNING)
Advance Care Planning (ACP) Provider Conversation Snapshot    Date of ACP Conversation: 08/15/18  Persons included in Conversation:  patient  Length of ACP Conversation in minutes:  <16 minutes (Non-Billable)    Authorized Decision Maker (if patient is incapable of making informed decisions): This person is:   Healthcare Agent/Medical Power of  under Advance Directive          For Patients with Decision Making Capacity:   Values/Goals: Exploration of values, goals, and preferences if recovery is not expected, even with continued medical treatment in the event of:  Imminent death  Severe, permanent brain injury  \"In these circumstances, what matters most to you? \"  Care focused more on comfort or quality of life. Conversation Outcomes / Follow-Up Plan:   Recommended communicating the plan and making copies for the healthcare agent, personal physician, and others as appropriate (e.g., health system) . Pt and wife both have living sweeney and advanced directives. Pt stated he forgot to bring them in and will try to do so in the future.  He indicated it directs no life prolonging measure in the above outlined situations

## 2018-08-15 NOTE — PROGRESS NOTES
HISTORY OF PRESENT ILLNESS  Hellen Garcia is a 72 y.o. male. Visit Vitals    /82    Pulse 76    Temp 98 °F (36.7 °C) (Oral)    Resp 18    Ht 5' 9\" (1.753 m)    Wt 245 lb 3.2 oz (111.2 kg)    SpO2 95%    BMI 36.21 kg/m2       Complete Physical   The history is provided by the patient. This is a new problem. Hypertension    The history is provided by the patient. This is a chronic problem. The current episode started more than 1 week ago. The problem has not changed since onset. Risk factors include male gender, hypertension and dyslipidemia. Cholesterol Problem   The history is provided by the patient. This is a chronic problem. The current episode started more than 1 week ago. The problem occurs daily. The problem has not changed since onset. Exacerbated by: diet. The symptoms are relieved by medications (diet). Review of Systems   Constitutional: Negative. Eyes: Wife noted \"yellow eyelids\"   Respiratory: Negative. Cardiovascular: Negative. Gastrointestinal: Negative. Neurological: Positive for tremors (has essential tremor. It seems to be getting worse over time. ). Physical Exam   Constitutional: He is oriented to person, place, and time. He appears well-developed and well-nourished. He does not have a sickly appearance. He does not appear ill. No distress. HENT:   Head: Normocephalic and atraumatic. Right Ear: External ear and ear canal normal. No drainage or tenderness. No mastoid tenderness. Tympanic membrane is not erythematous. Left Ear: External ear and ear canal normal. No drainage or tenderness. No mastoid tenderness. Tympanic membrane is not erythematous. Nose: Nose normal. No mucosal edema, rhinorrhea or sinus tenderness. No epistaxis. Right sinus exhibits no maxillary sinus tenderness and no frontal sinus tenderness. Left sinus exhibits no maxillary sinus tenderness and no frontal sinus tenderness.    Mouth/Throat: Uvula is midline, oropharynx is clear and moist and mucous membranes are normal. No oropharyngeal exudate, posterior oropharyngeal edema or posterior oropharyngeal erythema. Eyes: Conjunctivae and EOM are normal. Pupils are equal, round, and reactive to light. Right eye exhibits no discharge. Left eye exhibits no discharge. No scleral icterus. Xanthelasma noted on eye lids   Neck: Normal range of motion and phonation normal. Neck supple. No JVD present. Carotid bruit is not present. No tracheal deviation and no edema present. No thyroid mass and no thyromegaly present. Cardiovascular: Normal rate, regular rhythm, normal heart sounds and intact distal pulses. Exam reveals no gallop and no friction rub. No murmur heard. Pulmonary/Chest: Effort normal and breath sounds normal. No respiratory distress. He has no wheezes. He has no rales. He exhibits no tenderness. Abdominal: Soft. Bowel sounds are normal. He exhibits no distension, no abdominal bruit, no pulsatile midline mass and no mass. There is no hepatosplenomegaly. There is no tenderness. There is no rebound, no guarding and no CVA tenderness. Hernia confirmed negative in the right inguinal area and confirmed negative in the left inguinal area. Genitourinary: Right testis shows no mass and no tenderness. Left testis shows no mass and no tenderness. Circumcised. Musculoskeletal: Normal range of motion. He exhibits no edema or tenderness. Lymphadenopathy:     He has no cervical adenopathy. Neurological: He is alert and oriented to person, place, and time. He has normal reflexes. He displays no tremor. No cranial nerve deficit. He exhibits normal muscle tone. Coordination normal.        Skin: Skin is warm and dry. No rash noted. He is not diaphoretic. No cyanosis or erythema. Nails show no clubbing. Psychiatric: He has a normal mood and affect. His behavior is normal. Judgment and thought content normal.   Nursing note and vitals reviewed.       ASSESSMENT and PLAN ICD-10-CM ICD-9-CM    1. Routine general medical examination at a health care facility P91.04 F95.3 METABOLIC PANEL, COMPREHENSIVE      LIPID PANEL      CBC WITH AUTOMATED DIFF   2. Essential hypertension F47 648.4 METABOLIC PANEL, COMPREHENSIVE      AMB POC EKG ROUTINE W/ 12 LEADS, INTER & REP   3. Hypercholesteremia E78.00 272.0 LIPID PANEL      AMB POC EKG ROUTINE W/ 12 LEADS, INTER & REP   4. Coronary artery disease involving coronary bypass graft of native heart without angina pectoris I25.810 414.05 LIPID PANEL      AMB POC EKG ROUTINE W/ 12 LEADS, INTER & REP   5. Essential tremor G25.0 333.1    6. Medication refill Z76.0 V68.1 metoprolol tartrate (LOPRESSOR) 50 mg tablet   7. Severe obesity (BMI 35.0-39.9) ([facility]) E66.01 278.01        BP OK     Update lab and EKG    Discussed BMI/weight, lifestyle, diet and exercise. Discussed effect on blood pressure, blood sugar, and joints especially  Focus on limiting white carbs, portion control, and moving more.     Update EKG    F/u 6 months

## 2018-08-15 NOTE — MR AVS SNAPSHOT
[address] [facility] 
 
 
 [address] [facility] [address] 
[phone] Patient: Pita Laureano MRN: [id number] BYP:8/52/7120 Visit Information Date & Time Provider Department Dept. Phone Encounter #  
 8/15/2018  4:00 PM Arti RodriguezFOODITY [phone] [id number] Follow-up Instructions Return in about 6 months (around 2/15/2019) for htn, cholesterol. Upcoming Health Maintenance Date Due  
 GLAUCOMA SCREENING Q2Y 5/22/2012 COLONOSCOPY 10/13/2016 Influenza Age 5 to Adult 8/1/2018 DTaP/Tdap/Td series (1 - Tdap) 10/24/2018* *Topic was postponed. The date shown is not the original due date. Allergies as of 8/15/2018  Review Complete On: 8/15/2018 By: Ross Oakley LPN No Known Allergies Current Immunizations  Reviewed on 10/3/2016 Name Date Influenza High Dose Vaccine PF 10/3/2016  5:20 PM, 12/21/2014 Influenza Vaccine 11/15/2017, 2/5/2014  4:48 PM  
 Influenza Vaccine Split 10/9/2012 Pneumococcal Conjugate (PCV-13) 10/3/2016  5:21 PM  
 Pneumococcal Polysaccharide (PPSV-23) 5/31/2016 12:00 AM, 2/5/2014  4:39 PM  
 Td 7/5/2015 Zoster Vaccine, Live 12/22/2014 Not reviewed this visit You Were Diagnosed With   
  
 Codes Comments Routine general medical examination at a health care facility    -  Primary ICD-10-CM: Z00.00 ICD-9-CM: V70.0 Essential hypertension     ICD-10-CM: I10 
ICD-9-CM: 401.9 Hypercholesteremia     ICD-10-CM: E78.00 ICD-9-CM: 272.0 Coronary artery disease involving coronary bypass graft of native heart without angina pectoris     ICD-10-CM: I25.810 ICD-9-CM: 414.05 Essential tremor     ICD-10-CM: G25.0 ICD-9-CM: 333.1 Medication refill     ICD-10-CM: Z76.0 ICD-9-CM: V68.1 Vitals BP Pulse Temp Resp Height(growth percentile) Weight(growth percentile) 141/82 76 98 °F (36.7 °C) (Oral) 18 5' 9\" (1.753 m) 245 lb 3.2 oz (111.2 kg) SpO2 BMI Smoking Status 95% 36.21 kg/m2 Former Smoker Vitals History BMI and BSA Data Body Mass Index Body Surface Area  
 36.21 kg/m 2 2.33 m 2 Preferred Pharmacy Pharmacy Name Phone [address] [place] [address],[address] [place], [address] [place] [address] [phone] Your Updated Medication List  
  
   
This list is accurate as of 8/15/18  4:49 PM.  Always use your most recent med list.  
  
  
  
  
 acetaminophen 325 mg tablet Commonly known as:  TYLENOL  
650 mg.  
  
 aspirin delayed-release 81 mg tablet Take  by mouth daily. escitalopram oxalate 20 mg tablet Commonly known as:  Marvin Carroll TAKE ONE TABLET BY MOUTH ONCE DAILY  
  
 ezetimibe 10 mg tablet Commonly known as:  Amado Hendricks TAKE 1 TABLET DAILY  
  
 metoprolol tartrate 50 mg tablet Commonly known as:  LOPRESSOR Take 1 Tab by mouth two (2) times a day. NITROSTAT 0.4 mg SL tablet Generic drug:  nitroglycerin  
  
 rosuvastatin 10 mg tablet Commonly known as:  CRESTOR  
TAKE 1 TABLET DAILY  
  
 tadalafil 20 mg tablet Commonly known as:  CIALIS Take 1 tablet by mouth as needed. temazepam 15 mg capsule Commonly known as:  RESTORIL 15 mg.  
  
  
  
  
Prescriptions Sent to Pharmacy Refills  
 metoprolol tartrate (LOPRESSOR) 50 mg tablet 4 Sig: Take 1 Tab by mouth two (2) times a day. Class: Normal  
 Pharmacy: [facility] [address] FADI GRAY [address] [facility], [address] [place] [address] Ph #: [phone] Route: Oral  
  
We Performed the Following AMB POC EKG ROUTINE W/ 12 LEADS, INTER & REP [04237 CPT(R)] Follow-up Instructions Return in about 6 months (around 2/15/2019) for htn, cholesterol. To-Do List   
 08/15/2018 Lab:  CBC WITH AUTOMATED DIFF   
  
 08/15/2018 Lab:  LIPID PANEL   
  
 08/15/2018   Lab:  METABOLIC PANEL, COMPREHENSIVE   
  
  
 Introducing \[facility]\"" & HEALTH SERVICES! Dear Heather Pyle: Thank you for requesting a [employer] account. Our records indicate that you already have an active R[employer] account. You can access your account anytime at [email or web address] Did you know that you can access your hospital and ER discharge instructions at any time in Rhytec? You can also review all of your test results from your hospital stay or ER visit. Additional Information If you have questions, please visit the Frequently Asked Questions section of the Rhytec website at [email or web address] Remember, Rhytec is NOT to be used for urgent needs. For medical emergencies, dial 911. Now available from your iPhone and Android! Please provide this summary of care documentation to your next provider. Your primary care clinician is listed as [facility]. If you have any questions after today's visit, please call [phone].

## 2018-08-15 NOTE — PROGRESS NOTES
ROOM # 6    Laura Dalal presents today for   Chief Complaint   Patient presents with    Complete Physical    Hypertension    Cholesterol Problem       Laura Dalal preferred language for health care discussion is english/other. Is someone accompanying this pt? no    Is the patient using any DME equipment during OV? no    Depression Screening:  PHQ over the last two weeks 8/15/2017   PHQ Not Done Active Diagnosis of Depression or Bipolar Disorder   Little interest or pleasure in doing things Not at all   Feeling down, depressed, irritable, or hopeless Not at all   Total Score PHQ 2 0       Learning Assessment:  Learning Assessment 9/10/2015   PRIMARY LEARNER Patient   HIGHEST LEVEL OF EDUCATION - PRIMARY LEARNER  > 4 YEARS OF COLLEGE   BARRIERS PRIMARY LEARNER NONE   PRIMARY LANGUAGE ENGLISH   LEARNER PREFERENCE PRIMARY VIDEOS   ANSWERED BY pateint   RELATIONSHIP SELF       Abuse Screening:  Abuse Screening Questionnaire 9/10/2015   Do you ever feel afraid of your partner? N   Are you in a relationship with someone who physically or mentally threatens you? N   Is it safe for you to go home? Y       Fall Risk  Fall Risk Assessment, last 12 mths 8/15/2018 2/15/2018 8/15/2017 5/23/2017 11/23/2016 10/3/2016 7/6/2016   Able to walk? Yes Yes Yes Yes Yes Yes Yes   Fall in past 12 months? No No No No No - No   Fall with injury? - - - - - - -   Number of falls in past 12 months - - - - - - -       Health Maintenance reviewed and discussed per provider. Yes    Laura Dalal is due for   Health Maintenance Due   Topic Date Due    GLAUCOMA SCREENING Q2Y  05/22/2012    COLONOSCOPY  10/13/2016    Influenza Age 9 to Adult  08/01/2018     Please order/place referral if appropriate. Advance Directive:  1. Do you have an advance directive in place? Patient Reply: yes    2. If not, would you like material regarding how to put one in place? Patient Reply: no    Coordination of Care:  1.  Have you been to the ER, urgent care clinic since your last visit? Hospitalized since your last visit? no    2. Have you seen or consulted any other health care providers outside of the [address] [place] [address] since your last visit? Include any pap smears or colon screening.  no

## 2018-10-10 DIAGNOSIS — Z76.0 MEDICATION REFILL: ICD-10-CM

## 2018-10-10 RX ORDER — ROSUVASTATIN CALCIUM 10 MG/1
TABLET, COATED ORAL
Qty: 90 TAB | Refills: 3 | Status: SHIPPED | OUTPATIENT
Start: 2018-10-10 | End: 2019-09-19 | Stop reason: SDUPTHER

## 2018-10-20 DIAGNOSIS — Z76.0 MEDICATION REFILL: ICD-10-CM

## 2018-10-22 RX ORDER — EZETIMIBE 10 MG/1
TABLET ORAL
Qty: 90 TAB | Refills: 3 | Status: SHIPPED | OUTPATIENT
Start: 2018-10-22 | End: 2019-09-19 | Stop reason: SDUPTHER

## 2018-10-22 NOTE — TELEPHONE ENCOUNTER
Requested Prescriptions     Pending Prescriptions Disp Refills    ezetimibe (ZETIA) 10 mg tablet [Pharmacy Med Name: EZETIMIBE 10MG TAB] 90 Tab 3     Sig: TAKE ONE TABLET BY MOUTH ONCE DAILY

## 2019-01-23 ENCOUNTER — OFFICE VISIT (OUTPATIENT)
Dept: INTERNAL MEDICINE CLINIC | Age: 74
End: 2019-01-23

## 2019-01-23 VITALS
OXYGEN SATURATION: 97 % | SYSTOLIC BLOOD PRESSURE: 136 MMHG | TEMPERATURE: 97.3 F | BODY MASS INDEX: 35.63 KG/M2 | RESPIRATION RATE: 18 BRPM | WEIGHT: 240.6 LBS | DIASTOLIC BLOOD PRESSURE: 67 MMHG | HEART RATE: 83 BPM | HEIGHT: 69 IN

## 2019-01-23 DIAGNOSIS — L03.012 PARONYCHIA OF LEFT THUMB: Primary | ICD-10-CM

## 2019-01-23 RX ORDER — CEPHALEXIN 500 MG/1
500 CAPSULE ORAL 4 TIMES DAILY
Qty: 40 CAP | Refills: 0 | Status: SHIPPED | OUTPATIENT
Start: 2019-01-23 | End: 2019-02-02

## 2019-01-23 NOTE — PROGRESS NOTES
HISTORY OF PRESENT ILLNESS  Donato Vernon is a 72 y.o. male. Visit Vitals  /67   Pulse 83   Temp 97.3 °F (36.3 °C) (Oral)   Resp 18   Ht 5' 9\" (1.753 m)   Wt 240 lb 9.6 oz (109.1 kg)   SpO2 97%   BMI 35.53 kg/m²       \"the mother of all hang nails\" for about 3 days. Finger is sore /left thumb        Review of Systems   Constitutional: Negative for chills and fever. Musculoskeletal:        Sore left thumb nail          Physical Exam   Constitutional: He is oriented to person, place, and time. He appears well-developed and well-nourished. No distress. Cardiovascular: Normal rate and regular rhythm. Pulmonary/Chest: Effort normal and breath sounds normal.   Musculoskeletal: He exhibits no edema. Hands:  Neurological: He is alert and oriented to person, place, and time. He displays tremor. Skin: Skin is warm and dry. He is not diaphoretic. Psychiatric: He has a normal mood and affect. Nursing note and vitals reviewed. ASSESSMENT and PLAN    ICD-10-CM ICD-9-CM    1.  Paronychia of left thumb L03.012 681.02 cephALEXin (KEFLEX) 500 mg capsule         Small needle prick revealed pus    Will start soaking with warm epsom salts 2-3 times a day  Keflex QID    F/u here 48 hours

## 2019-01-23 NOTE — PROGRESS NOTES
Donato Vernon is a 72 y.o. male (: 1945) presenting to address:    Chief Complaint   Patient presents with    Finger Pain     Pt states that he has a hang nail on left thumb       Vitals:    19 1433   BP: 136/67   Pulse: 83   Resp: 18   Temp: 97.3 °F (36.3 °C)   TempSrc: Oral   SpO2: 97%   Weight: 240 lb 9.6 oz (109.1 kg)   Height: 5' 9\" (1.753 m)   PainSc:   2   PainLoc: Finger       Hearing/Vision:   No exam data present    Learning Assessment:     Learning Assessment 9/10/2015   PRIMARY LEARNER Patient   HIGHEST LEVEL OF EDUCATION - PRIMARY LEARNER  > 4 YEARS OF COLLEGE   BARRIERS PRIMARY LEARNER NONE   PRIMARY LANGUAGE ENGLISH   LEARNER PREFERENCE PRIMARY VIDEOS   ANSWERED BY pateint   RELATIONSHIP SELF     Depression Screening:     PHQ over the last two weeks 8/15/2017   PHQ Not Done Active Diagnosis of Depression or Bipolar Disorder   Little interest or pleasure in doing things Not at all   Feeling down, depressed, irritable, or hopeless Not at all   Total Score PHQ 2 0     Fall Risk Assessment:     Fall Risk Assessment, last 12 mths 2019   Able to walk? Yes   Fall in past 12 months? No   Fall with injury? -   Number of falls in past 12 months -     Abuse Screening:     Abuse Screening Questionnaire 9/10/2015   Do you ever feel afraid of your partner? N   Are you in a relationship with someone who physically or mentally threatens you? N   Is it safe for you to go home? Y     Coordination of Care Questionaire:   1. Have you been to the ER, urgent care clinic since your last visit? Hospitalized since your last visit? YES    2. Have you seen or consulted any other health care providers outside of the [address] [place] [address] since your last visit? Include any pap smears or colon screening. NO    Advanced Directive:   1. Do you have an Advanced Directive? YES    2. Would you like information on Advanced Directives?  NO

## 2019-01-23 NOTE — PATIENT INSTRUCTIONS
Paronychia: Care Instructions  Your Care Instructions  Paronychia (say \"fakz-tw-ME-nirmala-uh\") is an infection of the skin around a fingernail or toenail. It happens when germs enter through a break in the skin. The doctor may have made a small cut in the infected area to drain the pus. Most cases of paronychia improve in a few days. But watch your symptoms and follow your doctor's advice. Though rare, a mild case can turn into something more serious and infect your entire finger or toe. Also, it is possible for an infection to return. Follow-up care is a key part of your treatment and safety. Be sure to make and go to all appointments, and call your doctor if you are having problems. It's also a good idea to know your test results and keep a list of the medicines you take. How can you care for yourself at home? · If your doctor told you how to care for your infected nail, follow the doctor's instructions. If you did not get instructions, follow this general advice:  ? Wash the area with clean water 2 times a day. Don't use hydrogen peroxide or alcohol, which can slow healing. ? You may cover the area with a thin layer of petroleum jelly, such as Vaseline, and a nonstick bandage. ? Apply more petroleum jelly and replace the bandage as needed. · If your doctor prescribed antibiotics, take them as directed. Do not stop taking them just because you feel better. You need to take the full course of antibiotics. · Take an over-the-counter pain medicine, such as acetaminophen (Tylenol), ibuprofen (Advil, Motrin), or naproxen (Aleve). Read and follow all instructions on the label. · Do not take two or more pain medicines at the same time unless the doctor told you to. Many pain medicines have acetaminophen, which is Tylenol. Too much acetaminophen (Tylenol) can be harmful. · Prop up the toe or finger so that it is higher than the level of your heart. This will help with pain and swelling. · Apply heat.  Put a warm water bottle, heating pad set on low, or warm cloth on your finger or toe. Do not go to sleep with a heating pad on your skin. · Soak the area in warm water twice a day for 15 minutes each time. After soaking, dry the area well and apply a thin layer of petroleum jelly, such as Vaseline. Put on a new bandage. When should you call for help? Call your doctor now or seek immediate medical care if:    · You have signs of new or worsening infection, such as:  ? Increased pain, swelling, warmth, or redness. ? Red streaks leading from the infected skin. ? Pus draining from the area. ? A fever.    Watch closely for changes in your health, and be sure to contact your doctor if:    · You do not get better as expected. Where can you learn more? Go to http://martin-shelly.info/. Enter C435 in the search box to learn more about \"Paronychia: Care Instructions. \"  Current as of: April 17, 2018  Content Version: 11.9  © 2032-0437 [employer]. Care instructions adapted under license by [employer] (which disclaims liability or warranty for this information). If you have questions about a medical condition or this instruction, always ask your healthcare professional. [address] any warranty or liability for your use of this information.

## 2019-01-28 ENCOUNTER — OFFICE VISIT (OUTPATIENT)
Dept: INTERNAL MEDICINE CLINIC | Age: 74
End: 2019-01-28

## 2019-01-28 VITALS
SYSTOLIC BLOOD PRESSURE: 147 MMHG | WEIGHT: 240.2 LBS | OXYGEN SATURATION: 97 % | TEMPERATURE: 98.3 F | HEIGHT: 69 IN | RESPIRATION RATE: 18 BRPM | HEART RATE: 69 BPM | BODY MASS INDEX: 35.58 KG/M2 | DIASTOLIC BLOOD PRESSURE: 75 MMHG

## 2019-01-28 DIAGNOSIS — L03.012 PARONYCHIA OF LEFT THUMB: Primary | ICD-10-CM

## 2019-01-28 NOTE — PROGRESS NOTES
Rochelle Campuzano is a 72 y.o. male (: 1945) presenting to address:    Chief Complaint   Patient presents with    Finger Swelling       Vitals:    19 1601   BP: 147/75   Pulse: 69   Resp: 18   Temp: 98.3 °F (36.8 °C)   TempSrc: Oral   SpO2: 97%   Weight: 240 lb 3.2 oz (109 kg)   Height: 5' 9\" (1.753 m)   PainSc:   0 - No pain       Hearing/Vision:   No exam data present    Learning Assessment:     Learning Assessment 9/10/2015   PRIMARY LEARNER Patient   HIGHEST LEVEL OF EDUCATION - PRIMARY LEARNER  > 4 YEARS OF COLLEGE   BARRIERS PRIMARY LEARNER NONE   PRIMARY LANGUAGE ENGLISH   LEARNER PREFERENCE PRIMARY VIDEOS   ANSWERED BY pateint   RELATIONSHIP SELF     Depression Screening:     PHQ over the last two weeks 8/15/2017   PHQ Not Done Active Diagnosis of Depression or Bipolar Disorder   Little interest or pleasure in doing things Not at all   Feeling down, depressed, irritable, or hopeless Not at all   Total Score PHQ 2 0     Fall Risk Assessment:     Fall Risk Assessment, last 12 mths 2019   Able to walk? Yes   Fall in past 12 months? No   Fall with injury? -   Number of falls in past 12 months -     Abuse Screening:     Abuse Screening Questionnaire 9/10/2015   Do you ever feel afraid of your partner? N   Are you in a relationship with someone who physically or mentally threatens you? N   Is it safe for you to go home? Y     Coordination of Care Questionaire:   1. Have you been to the ER, urgent care clinic since your last visit? Hospitalized since your last visit? NO    2. Have you seen or consulted any other health care providers outside of the [address] [place] [address] since your last visit? Include any pap smears or colon screening. NO    Advanced Directive:   1. Do you have an Advanced Directive? NO    2. Would you like information on Advanced Directives?  NO

## 2019-01-28 NOTE — PROGRESS NOTES
HISTORY OF PRESENT ILLNESS  Ronaldo Bhandari is a 72 y.o. male. Visit Vitals  /75   Pulse 69   Temp 98.3 °F (36.8 °C) (Oral)   Resp 18   Ht 5' 9\" (1.753 m)   Wt 240 lb 3.2 oz (109 kg)   SpO2 97%   BMI 35.47 kg/m²       Here to f/u on left thumb infection. Reports feeling much better. Thumb is not sore today. There is some redness        Review of Systems   Constitutional: Negative for chills and fever. Musculoskeletal:        Left thumb redness       Physical Exam   Constitutional: He is oriented to person, place, and time. He appears well-developed and well-nourished. No distress. Cardiovascular: Normal rate. Pulmonary/Chest: Effort normal.   Musculoskeletal:   Left thumb--redness persists but less so. Peeling skin c/w resolving infection. 1-2 mm blood blister noted. Neurological: He is alert and oriented to person, place, and time. Skin: Skin is warm and dry. He is not diaphoretic. Psychiatric: He has a normal mood and affect. Nursing note and vitals reviewed. ASSESSMENT and PLAN    ICD-10-CM ICD-9-CM    1. Paronychia of left thumb L03.012 681.02        Doing well. Continue antibiotics and soaking.  Gently push cuticle back    F/u prn

## 2019-02-22 DIAGNOSIS — Z76.0 MEDICATION REFILL: ICD-10-CM

## 2019-02-22 RX ORDER — ESCITALOPRAM OXALATE 20 MG/1
TABLET ORAL
Qty: 90 TAB | Refills: 5 | Status: SHIPPED | OUTPATIENT
Start: 2019-02-22 | End: 2020-06-22

## 2019-09-19 ENCOUNTER — OFFICE VISIT (OUTPATIENT)
Dept: INTERNAL MEDICINE CLINIC | Age: 74
End: 2019-09-19

## 2019-09-19 VITALS
DIASTOLIC BLOOD PRESSURE: 85 MMHG | BODY MASS INDEX: 35.99 KG/M2 | HEART RATE: 74 BPM | TEMPERATURE: 98.1 F | RESPIRATION RATE: 20 BRPM | OXYGEN SATURATION: 96 % | HEIGHT: 69 IN | SYSTOLIC BLOOD PRESSURE: 135 MMHG | WEIGHT: 243 LBS

## 2019-09-19 DIAGNOSIS — I10 ESSENTIAL HYPERTENSION: ICD-10-CM

## 2019-09-19 DIAGNOSIS — Z76.0 MEDICATION REFILL: ICD-10-CM

## 2019-09-19 DIAGNOSIS — Z00.00 MEDICARE ANNUAL WELLNESS VISIT, SUBSEQUENT: Primary | ICD-10-CM

## 2019-09-19 DIAGNOSIS — G25.0 ESSENTIAL TREMOR: ICD-10-CM

## 2019-09-19 DIAGNOSIS — T16.2XXA FOREIGN BODY OF LEFT EAR, INITIAL ENCOUNTER: ICD-10-CM

## 2019-09-19 DIAGNOSIS — E78.00 HYPERCHOLESTEREMIA: ICD-10-CM

## 2019-09-19 DIAGNOSIS — Z12.11 SCREEN FOR COLON CANCER: ICD-10-CM

## 2019-09-19 RX ORDER — ROSUVASTATIN CALCIUM 10 MG/1
10 TABLET, COATED ORAL DAILY
Qty: 90 TAB | Refills: 3 | Status: SHIPPED | OUTPATIENT
Start: 2019-09-19 | End: 2020-12-23

## 2019-09-19 RX ORDER — EZETIMIBE 10 MG/1
TABLET ORAL
Qty: 90 TAB | Refills: 3 | Status: SHIPPED | OUTPATIENT
Start: 2019-09-19 | End: 2020-12-10

## 2019-09-19 NOTE — PATIENT INSTRUCTIONS
Medicare Wellness Visit, Male    The best way to live healthy is to have a lifestyle where you eat a well-balanced diet, exercise regularly, limit alcohol use, and quit all forms of tobacco/nicotine, if applicable. Regular preventive services are another way to keep healthy. Preventive services (vaccines, screening tests, monitoring & exams) can help personalize your care plan, which helps you manage your own care. Screening tests can find health problems at the earliest stages, when they are easiest to treat. [facility] follows the current, evidence-based guidelines published by the [facility] Kin[facility] ([facility]) when recommending preventive services for our patients. Because we follow these guidelines, sometimes recommendations change over time as research supports it. (For example, a prostate screening blood test is no longer routinely recommended for men with no symptoms.)  Of course, you and your doctor may decide to screen more often for some diseases, based on your risk and co-morbidities (chronic disease you are already diagnosed with). Preventive services for you include:  - Medicare offers their members a free annual wellness visit, which is time for you and your primary care provider to discuss and plan for your preventive service needs. Take advantage of this benefit every year!  -All adults over age 72 should receive the recommended pneumonia vaccines. Current USPSTF guidelines recommend a series of two vaccines for the best pneumonia protection.   -All adults should have a flu vaccine yearly and an ECG.  All adults age 61 and older should receive a shingles vaccine once in their lifetime.    -All adults age 38-68 who are overweight should have a diabetes screening test once every three years.   -Other screening tests & preventive services for persons with diabetes include: an eye exam to screen for diabetic retinopathy, a kidney function test, a foot exam, and stricter control over your cholesterol.   -Cardiovascular screening for adults with routine risk involves an electrocardiogram (ECG) at intervals determined by the provider.   -Colorectal cancer screening should be done for adults age 54-65 with no increased risk factors for colorectal cancer. There are a number of acceptable methods of screening for this type of cancer. Each test has its own benefits and drawbacks. Discuss with your provider what is most appropriate for you during your annual wellness visit. The different tests include: colonoscopy (considered the best screening method), a fecal occult blood test, a fecal DNA test, and sigmoidoscopy.  -All adults born between [facility] should be screened once for Hepatitis C.  -An Abdominal Aortic Aneurysm (AAA) Screening is recommended for men age 73-68 who has ever smoked in their lifetime.      Here is a list of your current Health Maintenance items (your personalized list of preventive services) with a due date:  Health Maintenance Due   Topic Date Due    Colonoscopy  10/13/2016    Annual Well Visit  01/23/2019    Flu Vaccine  08/01/2019

## 2019-09-19 NOTE — PROGRESS NOTES
ROOM # 5    Ramon Lares presents today for   Chief Complaint   Patient presents with    Annual Wellness Visit       Ramon Lares preferred language for health care discussion is english/other. Is someone accompanying this pt? no    Is the patient using any DME equipment during [address]? no    Depression Screening:  3 most recent PHQ Screens 8/15/2017   PHQ Not Done Active Diagnosis of Depression or Bipolar Disorder   Little interest or pleasure in doing things Not at all   Feeling down, depressed, irritable, or hopeless Not at all   Total Score PHQ 2 0       Learning Assessment:  Learning Assessment 9/10/2015   PRIMARY LEARNER Patient   HIGHEST LEVEL OF EDUCATION - PRIMARY LEARNER  > 4 YEARS OF COLLEGE   BARRIERS PRIMARY LEARNER NONE   PRIMARY LANGUAGE ENGLISH   LEARNER PREFERENCE PRIMARY VIDEOS   ANSWERED BY pateint   RELATIONSHIP SELF       Abuse Screening:  Abuse Screening Questionnaire 9/10/2015   Do you ever feel afraid of your partner? N   Are you in a relationship with someone who physically or mentally threatens you? N   Is it safe for you to go home? Y       Fall Risk  Fall Risk Assessment, last 12 mths 1/23/2019 8/15/2018 2/15/2018 8/15/2017 5/23/2017 11/23/2016 10/3/2016   Able to walk? Yes Yes Yes Yes Yes Yes Yes   Fall in past 12 months? No No No No No No -   Fall with injury? - - - - - - -   Number of falls in past 12 months - - - - - - -           Health Maintenance reviewed and discussed per provider. Yes    Ramon Lares is due for   Health Maintenance Due   Topic Date Due    Shingrix Vaccine Age 49> (1 of 2) 05/22/1997    AAA Screening 73-67 YO Male Smoking Patients  05/22/2012    DTaP/Tdap/Td series (1 - Tdap) 07/06/2015    COLONOSCOPY  10/13/2016    MEDICARE YEARLY EXAM  01/23/2019    Influenza Age 9 to Adult  08/01/2019     Please order/place referral if appropriate. Advance Directive:  1. Do you have an advance directive in place? Patient Reply: no    2.  If not, would you like material regarding how to put one in place? Patient Reply: no    Coordination of Care:  1. Have you been to the ER, urgent care clinic since your last visit? Hospitalized since your last visit? yes    2. Have you seen or consulted any other health care providers outside of the [address] [place] [address] since your last visit? Include any pap smears or colon screening.  no

## 2019-09-19 NOTE — PROGRESS NOTES
This is the Subsequent Medicare Annual Wellness Exam, performed 12 months or more after the Initial AWV or the last Subsequent AWV    I have reviewed the patient's medical history in detail and updated the computerized patient record. History     Past Medical History:   Diagnosis Date    Anxiety     ASCVD (arteriosclerotic cardiovascular disease)     Benign essential tremor     BPH (benign prostatic hyperplasia)     Depression     Gross hematuria     Hypercholesterolemia     Hypertension     MI [facility]myocardial infarction) ([facility] [address] 2005    Sleep apnea       Past Surgical History:   Procedure Laterality Date    HX COLONOSCOPY  10/17/11    5 yr f/u, Dr. Giles Rooney  5/24/16    LIMA to LAD, SVG OM-LPLV, 3 v total    HX CORONARY STENT PLACEMENT  2005    X2 , Dr. Barron Dillon  2005    HX UROLOGICAL  3/28/13    Cystoscopy, Retrograde Pyelogram, Bladder Biopsy     HX VASECTOMY       Current Outpatient Medications   Medication Sig Dispense Refill    escitalopram oxalate (LEXAPRO) 20 mg tablet TAKE ONE TABLET BY MOUTH ONCE DAILY 90 Tab 5    ezetimibe (ZETIA) 10 mg tablet TAKE ONE TABLET BY MOUTH ONCE DAILY 90 Tab 3    rosuvastatin (CRESTOR) 10 mg tablet TAKE 1 TABLET DAILY 90 Tab 3    metoprolol tartrate (LOPRESSOR) 50 mg tablet Take 1 Tab by mouth two (2) times a day. 180 Tab 4    temazepam (RESTORIL) 15 mg capsule 15 mg.      NITROSTAT 0.4 mg SL tablet       acetaminophen (TYLENOL) 325 mg tablet 650 mg.      tadalafil (CIALIS) 20 mg tablet Take 1 tablet by mouth as needed. 8 tablet 5    aspirin delayed-release 81 mg tablet Take  by mouth daily.        No Known Allergies  Family History   Problem Relation Age of Onset    Cancer Mother         Oral    Cancer Maternal Grandfather         Renal Cell    Colon Cancer Paternal Aunt      Social History     Tobacco Use    Smoking status: Former Smoker     Packs/day: 1.00     Years: 28.00     Pack years: 28.00     Types: Cigarettes     Last attempt to quit: 1989     Years since quittin.7    Smokeless tobacco: Never Used   Substance Use Topics    Alcohol use: Yes     Alcohol/week: 11.7 standard drinks     Types: 14 Glasses of wine per week     Patient Active Problem List   Diagnosis Code    Prostate enlargement N40.0    Hypercholesteremia E78.00    Depression F32.9    Gross hematuria R31.0    Hypertrophy of prostate with urinary obstruction and other lower urinary tract symptoms (LUTS) N40.1    Dysuria R30.0    Essential hypertension I10    Coronary artery disease involving native heart without angina pectoris I25.10    Advance directive discussed with patient Z70.80    Severe obesity (BMI 35.0-39. 9) E66.01    Essential tremor G25.0       Depression Risk Factor Screening:     3 most recent PHQ Screens 8/15/2017   PHQ Not Done Active Diagnosis of Depression or Bipolar Disorder   Little interest or pleasure in doing things Not at all   Feeling down, depressed, irritable, or hopeless Not at all   Total Score PHQ 2 0     Alcohol Risk Factor Screening: On any occasion in the past three months you have had more than 7 drinks containing alcohol    Functional Ability and Level of Safety:   Hearing Loss  The patient wears hearing aids. Has Moderate to severe hearing loss    Activities of Daily Living  The home contains: [employer]  Patient does total self care    Fall Risk  Fall Risk Assessment, last 12 mths 2019   Able to walk? Yes   Fall in past 12 months?  No   Fall with injury? -   Number of falls in past 12 months -       Abuse Screen  Patient is not abused    Cognitive Screening   Evaluation of Cognitive Function:  Has your family/caregiver stated any concerns about your memory: no  Normal, Animal Naming test    Patient Care Team   Patient Care Team:  Ap Driscoll MD as PCP - General (Internal Medicine)  Garcia Good MD as Consulting Provider (Gastroenterology)  [place], Kezia Sanchez MD as Consulting Provider (Surgery)  Edwina Pretty MD as Consulting Provider (Thoracic Diseases)    Assessment/Plan   Education and counseling provided:  Are appropriate based on today's review and evaluation    Diagnoses and all orders for this visit:    1. Medicare annual wellness visit, subsequent    2.  Screen for colon cancer  -     REFERRAL FOR COLONOSCOPY        Health Maintenance Due   Topic Date Due    COLONOSCOPY  10/13/2016    MEDICARE YEARLY EXAM  01/23/2019    Influenza Age 5 to Adult  08/01/2019

## 2019-09-19 NOTE — PROGRESS NOTES
HISTORY OF PRESENT ILLNESS  Julieta Bar is a 69 y.o. male. Visit Vitals  /85 (BP 1 Location: Right arm, BP Patient Position: Sitting)   Pulse 74   Temp 98.1 °F (36.7 °C) (Oral)   Resp 20   Ht 5' 9\" (1.753 m)   Wt 243 lb (110.2 kg)   SpO2 96%   BMI 35.88 kg/m²       Pt also has CAD, cholesterol issues, hypertension and essential tremor    Pt also has an essential tremor which is slightly worsening. He is concerned because his father has Parkinsonism  But his tremor is with action. Not at rest      Pt's wife had expressed concerns about pt's dyspnea. Pt has h/o CABG 3 yrs ago. 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 Can't do as much as he used to. Has mild Dyspnea but states it does not interfere with his usual activities. States this is not like any other time he has had cardiac sxs. No chest pain or pressure, no shoulder pain, no diaphoresis as he had previously. He feels he is just out of shape. Other   The history is provided by the patient (pt thinks a part of hearing aid is stuck in his left ear. ). This is a new problem. Pertinent negatives include no chest pain, no headaches and no shortness of breath. Hypertension    The history is provided by the patient. This is a chronic problem. The current episode started more than 1 week ago. The problem has not changed since onset. Pertinent negatives include no chest pain, no palpitations, no headaches, no dizziness and no shortness of breath. There are no associated agents to hypertension. Risk factors include a sedentary lifestyle, hypertension, male gender and dyslipidemia. Review of Systems   Constitutional: Negative for chills and fever. Respiratory: Negative for shortness of breath. Cardiovascular: Negative for chest pain and palpitations. Neurological: Negative for dizziness and headaches. Physical Exam   Constitutional: He is oriented to person, place, and time. He appears well-developed and well-nourished. No distress. HENT:   There is a black foreign body in his left ear. It appears to be the tip of his hearing aid. It is deep in the canal.   Cardiovascular: Normal rate and regular rhythm. Pulmonary/Chest: Effort normal and breath sounds normal.   Musculoskeletal: He exhibits no edema. Neurological: He is alert and oriented to person, place, and time. Skin: Skin is warm and dry. He is not diaphoretic. Psychiatric: He has a normal mood and affect. Nursing note and vitals reviewed. ASSESSMENT and PLAN    ICD-10-CM ICD-9-CM                  3. Essential hypertension J62 288.9 METABOLIC PANEL, COMPREHENSIVE   4. Hypercholesteremia E78.00 272.0 LIPID PANEL   5. Essential tremor G25.0 333.1    6. Foreign body of left ear, initial encounter T16. 2XXA [address]    7. Medication refill Z76.0 V68.1 ezetimibe (ZETIA) 10 mg tablet      rosuvastatin (CRESTOR) 10 mg tablet       BP controlled  Update lab when fasting  Tremor about the same.  Reassured that he has no other features of Parkinsonism at present    FB in ear--refer back to ENT    Refills as noted    F/u 6 months for BP, CHOl, Tremor recheck

## 2020-03-12 DIAGNOSIS — Z76.0 MEDICATION REFILL: ICD-10-CM

## 2020-03-13 RX ORDER — METOPROLOL TARTRATE 50 MG/1
50 TABLET ORAL 2 TIMES DAILY
Qty: 180 TAB | Refills: 4 | Status: SHIPPED | OUTPATIENT
Start: 2020-03-13 | End: 2021-03-29

## 2020-07-14 ENCOUNTER — OFFICE VISIT (OUTPATIENT)
Dept: INTERNAL MEDICINE CLINIC | Age: 75
End: 2020-07-14

## 2020-07-14 ENCOUNTER — HOSPITAL ENCOUNTER (OUTPATIENT)
Dept: LAB | Age: 75
Discharge: HOME | End: 2020-07-14
Payer: MEDICARE

## 2020-07-14 VITALS
HEIGHT: 69 IN | DIASTOLIC BLOOD PRESSURE: 77 MMHG | HEART RATE: 72 BPM | WEIGHT: 237 LBS | RESPIRATION RATE: 20 BRPM | BODY MASS INDEX: 35.1 KG/M2 | OXYGEN SATURATION: 97 % | SYSTOLIC BLOOD PRESSURE: 123 MMHG | TEMPERATURE: 95.5 F

## 2020-07-14 DIAGNOSIS — Z76.0 MEDICATION REFILL: ICD-10-CM

## 2020-07-14 DIAGNOSIS — R19.5 LOOSE STOOLS: ICD-10-CM

## 2020-07-14 DIAGNOSIS — R61 EXCESSIVE SWEATING: ICD-10-CM

## 2020-07-14 DIAGNOSIS — G25.0 ESSENTIAL TREMOR: ICD-10-CM

## 2020-07-14 DIAGNOSIS — I10 ESSENTIAL HYPERTENSION: ICD-10-CM

## 2020-07-14 DIAGNOSIS — I10 ESSENTIAL HYPERTENSION: Primary | ICD-10-CM

## 2020-07-14 DIAGNOSIS — E78.00 HYPERCHOLESTEREMIA: ICD-10-CM

## 2020-07-14 DIAGNOSIS — R23.2 FLUSHING: ICD-10-CM

## 2020-07-14 DIAGNOSIS — I25.810 CORONARY ARTERY DISEASE INVOLVING CORONARY BYPASS GRAFT OF NATIVE HEART WITHOUT ANGINA PECTORIS: Chronic | ICD-10-CM

## 2020-07-14 LAB
ALBUMIN SERPL-MCNC: 3.6 G/DL (ref 3.4–5)
ALBUMIN/GLOB SERPL: 1.2 {RATIO} (ref 0.8–1.7)
ALP SERPL-CCNC: 85 U/L (ref 45–117)
ALT SERPL-CCNC: 33 U/L (ref 16–61)
ANION GAP SERPL CALC-SCNC: 7 MMOL/L (ref 3–18)
AST SERPL-CCNC: 29 U/L (ref 10–38)
BILIRUB SERPL-MCNC: 1 MG/DL (ref 0.2–1)
BUN SERPL-MCNC: 21 MG/DL (ref 7–18)
BUN/CREAT SERPL: 24 (ref 12–20)
CALCIUM SERPL-MCNC: 8.6 MG/DL (ref 8.5–10.1)
CHLORIDE SERPL-SCNC: 105 MMOL/L (ref 100–111)
CHOLEST SERPL-MCNC: 134 MG/DL
CO2 SERPL-SCNC: 28 MMOL/L (ref 21–32)
CREAT SERPL-MCNC: 0.89 MG/DL (ref 0.6–1.3)
GLOBULIN SER CALC-MCNC: 3.1 G/DL (ref 2–4)
GLUCOSE SERPL-MCNC: 76 MG/DL (ref 74–99)
HDLC SERPL-MCNC: 54 MG/DL (ref 40–60)
HDLC SERPL: 2.5 {RATIO} (ref 0–5)
LDLC SERPL CALC-MCNC: 40.2 MG/DL (ref 0–100)
LIPID PROFILE,FLP: ABNORMAL
POTASSIUM SERPL-SCNC: 4.3 MMOL/L (ref 3.5–5.5)
PROT SERPL-MCNC: 6.7 G/DL (ref 6.4–8.2)
SODIUM SERPL-SCNC: 140 MMOL/L (ref 136–145)
T4 FREE SERPL-MCNC: 0.9 NG/DL (ref 0.7–1.5)
TRIGL SERPL-MCNC: 199 MG/DL (ref ?–150)
TSH SERPL DL<=0.05 MIU/L-ACNC: 1.36 UIU/ML (ref 0.36–3.74)
VLDLC SERPL CALC-MCNC: 39.8 MG/DL

## 2020-07-14 PROCEDURE — 84439 ASSAY OF FREE THYROXINE: CPT

## 2020-07-14 PROCEDURE — 36415 COLL VENOUS BLD VENIPUNCTURE: CPT

## 2020-07-14 PROCEDURE — 80061 LIPID PANEL: CPT

## 2020-07-14 PROCEDURE — 80053 COMPREHEN METABOLIC PANEL: CPT

## 2020-07-14 RX ORDER — NITROGLYCERIN 0.4 MG/1
0.4 TABLET SUBLINGUAL
Qty: 1 BOTTLE | Refills: 5 | Status: SHIPPED | OUTPATIENT
Start: 2020-07-14

## 2020-07-14 NOTE — PROGRESS NOTES
HISTORY OF PRESENT ILLNESS  Gonzalo Nogueira is a 70 y.o. male. Visit Vitals  /77 (BP 1 Location: Right arm, BP Patient Position: Sitting)   Pulse 72   Temp (!) 95.5 °F (35.3 °C) (Temporal)   Resp 20   Ht 5' 9\" (1.753 m)   Wt 237 lb (107.5 kg)   SpO2 97%   BMI 35.00 kg/m²       Hypertension    The history is provided by the patient. This is a chronic problem. The current episode started more than 1 week ago. The problem has not changed since onset. Pertinent negatives include no chest pain, no palpitations, no headaches, no dizziness and no shortness of breath. There are no associated agents to hypertension. Risk factors include obesity, a sedentary lifestyle, male gender and hypertension. Cholesterol Problem   The history is provided by the patient. This is a chronic problem. The current episode started more than 1 week ago. The problem occurs daily. The problem has not changed since onset. Pertinent negatives include no chest pain, no headaches and no shortness of breath. Review of Systems   Constitutional: Positive for diaphoresis. Negative for chills and fever. Respiratory: Negative for cough and shortness of breath. Cardiovascular: Negative for chest pain and palpitations. Neurological: Positive for tremors (with physical stress. notices it with a glass of water. Steering wheel. ). Negative for dizziness, sensory change, speech change, focal weakness and headaches. Physical Exam  Vitals signs and nursing note reviewed. Constitutional:       General: He is not in acute distress. Appearance: Normal appearance. He is well-developed. He is obese. He is not diaphoretic. Cardiovascular:      Rate and Rhythm: Normal rate and regular rhythm. Pulmonary:      Effort: Pulmonary effort is normal.      Breath sounds: Normal breath sounds. Musculoskeletal:      Right lower leg: No edema. Left lower leg: No edema. Skin:     General: Skin is warm and dry.    Neurological:      General: No focal deficit present. Mental Status: He is alert and oriented to person, place, and time. Comments: No tremor noted     Psychiatric:         Mood and Affect: Mood normal.         Behavior: Behavior normal.         ASSESSMENT and PLAN    ICD-10-CM ICD-9-CM    1. Essential hypertension  V64 536.3 METABOLIC PANEL, COMPREHENSIVE      TSH AND FREE T4   2. Hypercholesteremia  E78.00 272.0 LIPID PANEL   3. Essential tremor  G25.0 333.1 TSH AND FREE T4   4. Coronary artery disease involving coronary bypass graft of native heart without angina pectoris  I25.810 414.05 Nitrostat 0.4 mg SL tablet   5. Excessive sweating  R61 780.8 TSH AND FREE T4      5-HIAA, UR   6. Flushing  R23.2 782.62 5-HIAA, UR   7. Loose stools  R19.5 787.7 5-HIAA, UR   8. Medication refill  Z76.0 V68.1 Nitrostat 0.4 mg SL tablet       BP controlled    Update lab    Doing well overall.     With flushing, sweating, and some loose stools, need to check thyroid and for carcinoid    F/u  Late Sept or October for [address]

## 2020-07-14 NOTE — PROGRESS NOTES
ROOM # 6    Pauly Romeo presents today for No chief complaint on file. Pauly Romeo preferred language for health care discussion is english/other. Is someone accompanying this pt? no    Is the patient using any DME equipment during [address]? no    Depression Screening:  3 most recent PHQ Screens 8/15/2017   PHQ Not Done Active Diagnosis of Depression or Bipolar Disorder   Little interest or pleasure in doing things Not at all   Feeling down, depressed, irritable, or hopeless Not at all   Total Score PHQ 2 0       Learning Assessment:  Learning Assessment 9/10/2015   PRIMARY LEARNER Patient   HIGHEST LEVEL OF EDUCATION - PRIMARY LEARNER  > 4 YEARS OF COLLEGE   BARRIERS PRIMARY LEARNER NONE   PRIMARY LANGUAGE ENGLISH   LEARNER PREFERENCE PRIMARY VIDEOS   ANSWERED BY pateint   RELATIONSHIP SELF       Abuse Screening:  Abuse Screening Questionnaire 9/19/2019 9/10/2015   Do you ever feel afraid of your partner? N N   Are you in a relationship with someone who physically or mentally threatens you? N N   Is it safe for you to go home? Y Y       Fall Risk  Fall Risk Assessment, last 12 mths 7/14/2020 1/23/2019 8/15/2018 2/15/2018 8/15/2017 5/23/2017 11/23/2016   Able to walk? Yes Yes Yes Yes Yes Yes Yes   Fall in past 12 months? No No No No No No No   Fall with injury? - - - - - - -   Number of falls in past 12 months - - - - - - -           Health Maintenance reviewed and discussed per provider. Yes    Pauly Romeo is due for   Health Maintenance Due   Topic Date Due    Colonoscopy  10/13/2016    Lipid Screen  08/15/2018     Please order/place referral if appropriate. Advance Directive:  1. Do you have an advance directive in place? Patient Reply: no    2. If not, would you like material regarding how to put one in place? Patient Reply: no    Coordination of Care:  1. Have you been to the ER, urgent care clinic since your last visit? Hospitalized since your last visit? no    2.  Have you seen or consulted any other health care providers outside of the [address] [place] [address] since your last visit? Include any pap smears or colon screening.  no

## 2020-07-24 LAB
Lab: 2.4 MG/L
Lab: 4 MG/24 HR (ref 0–14.9)

## 2020-08-06 DIAGNOSIS — R19.5 LOOSE STOOLS: ICD-10-CM

## 2020-08-06 DIAGNOSIS — R23.2 FLUSHING: ICD-10-CM

## 2020-08-06 DIAGNOSIS — R61 EXCESSIVE SWEATING: ICD-10-CM

## 2020-09-22 ENCOUNTER — OFFICE VISIT (OUTPATIENT)
Dept: INTERNAL MEDICINE CLINIC | Age: 75
End: 2020-09-22
Payer: MEDICARE

## 2020-09-22 VITALS
HEART RATE: 78 BPM | DIASTOLIC BLOOD PRESSURE: 80 MMHG | WEIGHT: 233 LBS | BODY MASS INDEX: 34.51 KG/M2 | TEMPERATURE: 97.3 F | HEIGHT: 69 IN | RESPIRATION RATE: 22 BRPM | OXYGEN SATURATION: 97 % | SYSTOLIC BLOOD PRESSURE: 131 MMHG

## 2020-09-22 DIAGNOSIS — Z00.00 MEDICARE ANNUAL WELLNESS VISIT, SUBSEQUENT: Primary | ICD-10-CM

## 2020-09-22 DIAGNOSIS — Z23 NEEDS FLU SHOT: ICD-10-CM

## 2020-09-22 PROCEDURE — G9717 DOC PT DX DEP/BP F/U NT REQ: HCPCS | Performed by: INTERNAL MEDICINE

## 2020-09-22 PROCEDURE — G0439 PPPS, SUBSEQ VISIT: HCPCS | Performed by: INTERNAL MEDICINE

## 2020-09-22 PROCEDURE — G8417 CALC BMI ABV UP PARAM F/U: HCPCS | Performed by: INTERNAL MEDICINE

## 2020-09-22 PROCEDURE — G8752 SYS BP LESS 140: HCPCS | Performed by: INTERNAL MEDICINE

## 2020-09-22 PROCEDURE — G0008 ADMIN INFLUENZA VIRUS VAC: HCPCS | Performed by: INTERNAL MEDICINE

## 2020-09-22 PROCEDURE — 3017F COLORECTAL CA SCREEN DOC REV: CPT | Performed by: INTERNAL MEDICINE

## 2020-09-22 PROCEDURE — 90694 VACC AIIV4 NO PRSRV 0.5ML IM: CPT | Performed by: INTERNAL MEDICINE

## 2020-09-22 PROCEDURE — G8427 DOCREV CUR MEDS BY ELIG CLIN: HCPCS | Performed by: INTERNAL MEDICINE

## 2020-09-22 PROCEDURE — G8754 DIAS BP LESS 90: HCPCS | Performed by: INTERNAL MEDICINE

## 2020-09-22 PROCEDURE — 1101F PT FALLS ASSESS-DOCD LE1/YR: CPT | Performed by: INTERNAL MEDICINE

## 2020-09-22 PROCEDURE — G8536 NO DOC ELDER MAL SCRN: HCPCS | Performed by: INTERNAL MEDICINE

## 2020-09-22 NOTE — PROGRESS NOTES
This is the Subsequent Medicare Annual Wellness Exam, performed 12 months or more after the Initial AWV or the last Subsequent AWV    I have reviewed the patient's medical history in detail and updated the computerized patient record. History     Patient Active Problem List   Diagnosis Code    Prostate enlargement N40.0    Hypercholesteremia E78.00    Depression F32.9    Gross hematuria R31.0    Hypertrophy of prostate with urinary obstruction and other lower urinary tract symptoms (LUTS) N40.1    Dysuria R30.0    Essential hypertension I10    Coronary artery disease involving native heart without angina pectoris I25.10    Advance directive discussed with patient Z70.80    Severe obesity (BMI 35.0-39. 9) E66.01    Essential tremor G25.0     Past Medical History:   Diagnosis Date    Anxiety     ASCVD (arteriosclerotic cardiovascular disease)     Benign essential tremor     BPH (benign prostatic hyperplasia)     Depression     Gross hematuria     Hypercholesterolemia     Hypertension     MI (myocardial infarction) ([facility] [address] 2005    Sleep apnea       Past Surgical History:   Procedure Laterality Date    HX COLONOSCOPY  10/17/11    5 yr f/u, Dr. Kirsten Elias  5/24/16    LIMA to LAD, SVG OM-LPLV, 3 v total    HX CORONARY STENT PLACEMENT  2005    X2 , Dr. Eber Newman  2005    HX UROLOGICAL  3/28/13    Cystoscopy, Retrograde Pyelogram, Bladder Biopsy     HX VASECTOMY       Current Outpatient Medications   Medication Sig Dispense Refill    Nitrostat 0.4 mg SL tablet 1 Tab by SubLINGual route every five (5) minutes as needed for Chest Pain. Indications: acute attack of angina 1 Bottle 5    escitalopram oxalate (LEXAPRO) 20 mg tablet Take 1 tablet by mouth once daily 90 Tab 4    metoprolol tartrate (LOPRESSOR) 50 mg tablet Take 1 Tab by mouth two (2) times a day.  180 Tab 4    ezetimibe (ZETIA) 10 mg tablet TAKE ONE TABLET BY MOUTH ONCE DAILY 90 Tab 3    rosuvastatin (CRESTOR) 10 mg tablet Take 1 Tab by mouth daily. 90 Tab 3    acetaminophen (TYLENOL) 325 mg tablet 650 mg.      aspirin delayed-release 81 mg tablet Take  by mouth daily. No Known Allergies    Family History   Problem Relation Age of Onset    Cancer Mother         Oral    Cancer Maternal Grandfather         Renal Cell    Colon Cancer Paternal Aunt      Social History     Tobacco Use    Smoking status: Former Smoker     Packs/day: 1.00     Years: 28.00     Pack years: 28.00     Types: Cigarettes     Last attempt to quit: 1989     Years since quittin.7    Smokeless tobacco: Never Used   Substance Use Topics    Alcohol use: Yes     Alcohol/week: 11.7 standard drinks     Types: 14 Glasses of wine per week       Depression Risk Factor Screening:     3 most recent PHQ Screens 2020   PHQ Not Done -   Little interest or pleasure in doing things Not at all   Feeling down, depressed, irritable, or hopeless Not at all   Total Score PHQ 2 0       Alcohol Risk Screen   Do you average more than 1 drink per night or more than 7 drinks a week: Yes    In the past three months have you have had more than 4 drinks containing alcohol on one occasion: Yes        Functional Ability and Level of Safety:   Hearing: The patient wears hearing aids. They are fairly new     Activities of Daily Living: The home contains: grab bars  Patient does total self care     Ambulation: with mild difficulty     Fall Risk:  Fall Risk Assessment, last 12 mths 2020   Able to walk? Yes   Fall in past 12 months?  No   Fall with injury? -   Number of falls in past 12 months -     Abuse Screen:  Patient is not abused       Cognitive Screening   Has your family/caregiver stated any concerns about your memory: no     Cognitive Screening: Normal - Animal Naming Test, Abnormal - Mini Cog Test--2/3 remembered    Patient Care Team   Patient Care Team:  Srikanth Stover MD as PCP - General (Internal Medicine)  Kayla Garcia MD as PCP - [facility] Empaneled Provider  Julián Mcmullen MD as Consulting Provider (Gastroenterology)  Yordan Guo MD as Consulting Provider (Surgery)  Zeferino Spain MD as Consulting Provider (Thoracic Diseases)  Iban Abreu MD as Consulting Provider (Gastroenterology)    Assessment/Plan   Education and counseling provided:  Are appropriate based on today's review and evaluation    Diagnoses and all orders for this visit:    1. Medicare annual wellness visit, subsequent    2.  Needs flu shot  -     FLU (FLUAD QUAD INFLUENZA VACCINE,QUAD,ADJUVANTED)  -     ADMIN INFLUENZA VIRUS VAC        discussed weight and cholesterol     Health Maintenance Due   Topic Date Due    Colonoscopy  10/13/2016    Flu Vaccine (1) 09/01/2020    Medicare Yearly Exam  09/19/2020    GLAUCOMA SCREENING Q2Y  11/12/2020           Physical Examination: General appearance - alert, well appearing, and in no distress and overweight  Chest - clear to auscultation, no wheezes, rales or rhonchi, symmetric air entry  Heart - normal rate, regular rhythm, normal S1, S2, no murmurs, rubs, clicks or gallops  Extremities - pedal edema1+edema on the right   Mental status normal

## 2020-09-22 NOTE — PROGRESS NOTES
Influenza immunization administered right deltoid  9/22/2020 by Mary Lou Nugent LPN with pt's consent. Patient tolerated procedure well. No reactions noted.

## 2020-09-22 NOTE — PROGRESS NOTES
ROOM # 4    Sherry Gabriel presents today for   Chief Complaint   Patient presents with    Annual Wellness Visit       Sherry Gabriel preferred language for health care discussion is english/other. Is someone accompanying this pt? no    Is the patient using any DME equipment during [address]? no    Depression Screening:  3 most recent PHQ Screens 8/15/2017   PHQ Not Done Active Diagnosis of Depression or Bipolar Disorder   Little interest or pleasure in doing things Not at all   Feeling down, depressed, irritable, or hopeless Not at all   Total Score PHQ 2 0       Learning Assessment:  Learning Assessment 9/10/2015   PRIMARY LEARNER Patient   HIGHEST LEVEL OF EDUCATION - PRIMARY LEARNER  > 4 YEARS OF COLLEGE   BARRIERS PRIMARY LEARNER NONE   PRIMARY LANGUAGE ENGLISH   LEARNER PREFERENCE PRIMARY VIDEOS   ANSWERED BY pateint   RELATIONSHIP SELF       Abuse Screening:  Abuse Screening Questionnaire 9/19/2019 9/10/2015   Do you ever feel afraid of your partner? N N   Are you in a relationship with someone who physically or mentally threatens you? N N   Is it safe for you to go home? Y Y       Fall Risk  Fall Risk Assessment, last 12 mths 9/22/2020 7/14/2020 1/23/2019 8/15/2018 2/15/2018 8/15/2017 5/23/2017   Able to walk? Yes Yes Yes Yes Yes Yes Yes   Fall in past 12 months? No No No No No No No   Fall with injury? - - - - - - -   Number of falls in past 12 months - - - - - - -           Health Maintenance reviewed and discussed per provider. Yes    Sherry Gabriel is due for   Health Maintenance Due   Topic Date Due    Colonoscopy  10/13/2016    Flu Vaccine (1) 09/01/2020    Medicare Yearly Exam  09/19/2020    GLAUCOMA SCREENING Q2Y  11/12/2020     Please order/place referral if appropriate. Advance Directive:  1. Do you have an advance directive in place? Patient Reply: no    2. If not, would you like material regarding how to put one in place? Patient Reply: no    Coordination of Care:  1.  Have you been to the ER, urgent care clinic since your last visit? Hospitalized since your last visit? no    2. Have you seen or consulted any other health care providers outside of the [address] [place] [address] since your last visit? Include any pap smears or colon screening.  no

## 2020-09-22 NOTE — PATIENT INSTRUCTIONS
Medicare Wellness Visit, Male    The best way to live healthy is to have a lifestyle where you eat a well-balanced diet, exercise regularly, limit alcohol use, and quit all forms of tobacco/nicotine, if applicable. Regular preventive services are another way to keep healthy. Preventive services (vaccines, screening tests, monitoring & exams) can help personalize your care plan, which helps you manage your own care. Screening tests can find health problems at the earliest stages, when they are easiest to treat. [facility] follows the current, evidence-based guidelines published by the [facility] ([facility]) when recommending preventive services for our patients. Because we follow these guidelines, sometimes recommendations change over time as research supports it. (For example, a prostate screening blood test is no longer routinely recommended for men with no symptoms). Of course, you and your doctor may decide to screen more often for some diseases, based on your risk and co-morbidities (chronic disease you are already diagnosed with). Preventive services for you include:  - Medicare offers their members a free annual wellness visit, which is time for you and your primary care provider to discuss and plan for your preventive service needs. Take advantage of this benefit every year!  -All adults over age 72 should receive the recommended pneumonia vaccines. Current USPSTF guidelines recommend a series of two vaccines for the best pneumonia protection.   -All adults should have a flu vaccine yearly and tetanus vaccine every 10 years.  -All adults age 48 and older should receive the shingles vaccines (series of two vaccines).        -All adults age 38-68 who are overweight should have a diabetes screening test once every three years.   -Other screening tests & preventive services for persons with diabetes include: an eye exam to screen for diabetic retinopathy, a kidney function test, a foot exam, and stricter control over your cholesterol.   -Cardiovascular screening for adults with routine risk involves an electrocardiogram (ECG) at intervals determined by the provider.   -Colorectal cancer screening should be done for adults age 54-65 with no increased risk factors for colorectal cancer. There are a number of acceptable methods of screening for this type of cancer. Each test has its own benefits and drawbacks. Discuss with your provider what is most appropriate for you during your annual wellness visit. The different tests include: colonoscopy (considered the best screening method), a fecal occult blood test, a fecal DNA test, and sigmoidoscopy.  -All adults born between [facility] should be screened once for Hepatitis C.  -An Abdominal Aortic Aneurysm (AAA) Screening is recommended for men age 73-68 who has ever smoked in their lifetime.      Here is a list of your current Health Maintenance items (your personalized list of preventive services) with a due date:  Health Maintenance Due   Topic Date Due    Colonoscopy  10/13/2016    Yearly Flu Vaccine (1) 09/01/2020    Annual Well Visit  09/19/2020    Glaucoma Screening   11/12/2020

## 2021-06-24 ENCOUNTER — OFFICE VISIT (OUTPATIENT)
Dept: INTERNAL MEDICINE CLINIC | Age: 76
End: 2021-06-24
Payer: MEDICARE

## 2021-06-24 ENCOUNTER — HOSPITAL ENCOUNTER (OUTPATIENT)
Dept: LAB | Age: 76
Discharge: HOME | End: 2021-06-24
Payer: MEDICARE

## 2021-06-24 VITALS
HEIGHT: 69 IN | OXYGEN SATURATION: 96 % | SYSTOLIC BLOOD PRESSURE: 134 MMHG | RESPIRATION RATE: 18 BRPM | HEART RATE: 75 BPM | TEMPERATURE: 97.2 F | DIASTOLIC BLOOD PRESSURE: 84 MMHG | BODY MASS INDEX: 33.77 KG/M2 | WEIGHT: 228 LBS

## 2021-06-24 DIAGNOSIS — E78.00 HYPERCHOLESTEREMIA: ICD-10-CM

## 2021-06-24 DIAGNOSIS — G25.0 ESSENTIAL TREMOR: ICD-10-CM

## 2021-06-24 DIAGNOSIS — I10 ESSENTIAL HYPERTENSION: Primary | ICD-10-CM

## 2021-06-24 DIAGNOSIS — I10 ESSENTIAL HYPERTENSION: ICD-10-CM

## 2021-06-24 LAB
ALBUMIN SERPL-MCNC: 3.8 G/DL (ref 3.4–5)
ALBUMIN/GLOB SERPL: 1.2 {RATIO} (ref 0.8–1.7)
ALP SERPL-CCNC: 83 U/L (ref 45–117)
ALT SERPL-CCNC: 32 U/L (ref 16–61)
ANION GAP SERPL CALC-SCNC: 6 MMOL/L (ref 3–18)
AST SERPL-CCNC: 23 U/L (ref 10–38)
BILIRUB SERPL-MCNC: 1 MG/DL (ref 0.2–1)
BUN SERPL-MCNC: 18 MG/DL (ref 7–18)
BUN/CREAT SERPL: 26 (ref 12–20)
CALCIUM SERPL-MCNC: 9 MG/DL (ref 8.5–10.1)
CHLORIDE SERPL-SCNC: 105 MMOL/L (ref 100–111)
CHOLEST SERPL-MCNC: 166 MG/DL
CO2 SERPL-SCNC: 28 MMOL/L (ref 21–32)
CREAT SERPL-MCNC: 0.69 MG/DL (ref 0.6–1.3)
GLOBULIN SER CALC-MCNC: 3.2 G/DL (ref 2–4)
GLUCOSE SERPL-MCNC: 76 MG/DL (ref 74–99)
HDLC SERPL-MCNC: 64 MG/DL (ref 40–60)
HDLC SERPL: 2.6 {RATIO} (ref 0–5)
LDLC SERPL CALC-MCNC: 77.2 MG/DL (ref 0–100)
LIPID PROFILE,FLP: ABNORMAL
POTASSIUM SERPL-SCNC: 4.2 MMOL/L (ref 3.5–5.5)
PROT SERPL-MCNC: 7 G/DL (ref 6.4–8.2)
SODIUM SERPL-SCNC: 139 MMOL/L (ref 136–145)
TRIGL SERPL-MCNC: 124 MG/DL (ref ?–150)
VLDLC SERPL CALC-MCNC: 24.8 MG/DL

## 2021-06-24 PROCEDURE — G8536 NO DOC ELDER MAL SCRN: HCPCS | Performed by: INTERNAL MEDICINE

## 2021-06-24 PROCEDURE — 99213 OFFICE O/P EST LOW 20 MIN: CPT | Performed by: INTERNAL MEDICINE

## 2021-06-24 PROCEDURE — 1101F PT FALLS ASSESS-DOCD LE1/YR: CPT | Performed by: INTERNAL MEDICINE

## 2021-06-24 PROCEDURE — 3017F COLORECTAL CA SCREEN DOC REV: CPT | Performed by: INTERNAL MEDICINE

## 2021-06-24 PROCEDURE — G8754 DIAS BP LESS 90: HCPCS | Performed by: INTERNAL MEDICINE

## 2021-06-24 PROCEDURE — G9717 DOC PT DX DEP/BP F/U NT REQ: HCPCS | Performed by: INTERNAL MEDICINE

## 2021-06-24 PROCEDURE — G8752 SYS BP LESS 140: HCPCS | Performed by: INTERNAL MEDICINE

## 2021-06-24 PROCEDURE — 80061 LIPID PANEL: CPT

## 2021-06-24 PROCEDURE — G8427 DOCREV CUR MEDS BY ELIG CLIN: HCPCS | Performed by: INTERNAL MEDICINE

## 2021-06-24 PROCEDURE — G8417 CALC BMI ABV UP PARAM F/U: HCPCS | Performed by: INTERNAL MEDICINE

## 2021-06-24 PROCEDURE — 80053 COMPREHEN METABOLIC PANEL: CPT

## 2021-06-24 PROCEDURE — 36415 COLL VENOUS BLD VENIPUNCTURE: CPT

## 2021-06-24 NOTE — PROGRESS NOTES
Reviewed record in preparation for visit and have obtained necessary documentation. Shantell Avitia is a 78 y.o.  male presents today for office visit for No chief complaint on file. . Pt is not fasting. Patient is accompanied by self. I have received verbal consent from Shantell Avitia to discuss any/all medical information while they are present in the room. Pt is in Room # [address] preferred language for health care discussion is english/other. Is the patient using any DME equipment during OV? NO    Advance Directive:  1. Do you have an advance directive in place? Patient Reply: NO    2. If not, would you like material regarding how to put one in place? NO    Coordination of Care:  1. Have you been to the ER, urgent care clinic since your last visit? Hospitalized since your last visit? NO    2. Have you seen or consulted any other health care providers outside of the [address] [place] [address] since your last visit? Include any pap smears or colon screening. YES, Orthopedic 2/2021    Upcoming Appts  No    VORB: No orders of the defined types were placed in this encounter.  Vivien Arroyo MD/Grace Parks LPN    Shantell Avitia is due for:   Health Maintenance Due   Topic    COVID-19 Vaccine (1)    Colorectal Cancer Screening Combo      Health Maintenance reviewed and discussed per provider  Please order/place referral if appropriate.     Requested Prescriptions      No prescriptions requested or ordered in this encounter       Learning Assessment:  Learning Assessment 9/10/2015   PRIMARY LEARNER Patient   HIGHEST LEVEL OF EDUCATION - PRIMARY LEARNER  > 4 YEARS OF COLLEGE   BARRIERS PRIMARY LEARNER NONE   PRIMARY LANGUAGE ENGLISH   LEARNER PREFERENCE PRIMARY VIDEOS   ANSWERED BY pateint   RELATIONSHIP SELF     Depression Screening:  3 most recent PHQ Screens 9/22/2020 8/15/2017   PHQ Not Done - Active Diagnosis of Depression or Bipolar Disorder   Little interest or pleasure in doing things Not at all Not at all   Feeling down, depressed, irritable, or hopeless Not at all Not at all   Total Score PHQ 2 0 0     Abuse Screening:  Abuse Screening Questionnaire 9/22/2020 9/19/2019 9/10/2015   Do you ever feel afraid of your partner? N N N   Are you in a relationship with someone who physically or mentally threatens you? N N N   Is it safe for you to go home? Katy [place]     Fall Risk  Fall Risk Assessment, last 12 mths 9/22/2020 7/14/2020 1/23/2019 8/15/2018 2/15/2018 8/15/2017 5/23/2017   Able to walk? Yes Yes Yes Yes Yes Yes Yes   Fall in past 12 months?  No No No No No No No   Number of falls in past 12 months - - - - - - -   Fall with injury? - - - - - - -     Recent Travel Screening and Travel History documentation     Travel Screening      No screening recorded since 06/23/21 0000      Travel History   Travel since 05/24/21     No documented travel since 05/24/21

## 2021-06-24 NOTE — PROGRESS NOTES
HISTORY OF PRESENT ILLNESS  Kelvin Rangel is a 78 y.o. male. /84 (BP 1 Location: Left upper arm, BP Patient Position: Sitting, BP Cuff Size: Large adult)   Pulse 75   Temp 97.2 °F (36.2 °C) (Temporal)   Resp 18   Ht 5' 9\" (1.753 m)   Wt 228 lb (103.4 kg)   SpO2 96%   BMI 33.67 kg/m²     Wt down 5 #    He feels fine    Hypertension   The history is provided by the patient. This is a chronic problem. The current episode started more than 1 week ago. The problem has not changed since onset. There are no associated agents to hypertension. Risk factors include dyslipidemia. Cholesterol Problem  The history is provided by the patient. This is a chronic problem. The current episode started more than 1 week ago. The problem occurs daily. The problem has not changed since onset. Review of Systems   Respiratory: Negative. Cardiovascular: Negative. Neurological: Negative. Physical Exam  Vitals and nursing note reviewed. Constitutional:       General: He is not in acute distress. Appearance: Normal appearance. He is well-developed. He is not diaphoretic. HENT:      Head: Normocephalic and atraumatic. Cardiovascular:      Rate and Rhythm: Normal rate and regular rhythm. Pulmonary:      Effort: Pulmonary effort is normal.      Breath sounds: Normal breath sounds. Musculoskeletal:      Right lower leg: No edema. Left lower leg: No edema. Skin:     General: Skin is warm and dry. Neurological:      Mental Status: He is alert and oriented to person, place, and time. Psychiatric:         Mood and Affect: Mood normal.         Behavior: Behavior normal.         ASSESSMENT and PLAN    ICD-10-CM ICD-9-CM    1. Essential hypertension  Y85 747.6 METABOLIC PANEL, COMPREHENSIVE   2. Hypercholesteremia  E78.00 272.0 LIPID PANEL   3.  Essential tremor  G25.0 333.1        BP controlled    Chol due for update    No recent cardiac    Update lab today    F/u 4 Months for MWV, HTN, chol recheck

## 2022-01-20 ENCOUNTER — OFFICE VISIT (OUTPATIENT)
Dept: INTERNAL MEDICINE CLINIC | Age: 77
End: 2022-01-20
Payer: MEDICARE

## 2022-01-20 VITALS
WEIGHT: 241 LBS | HEART RATE: 68 BPM | RESPIRATION RATE: 18 BRPM | HEIGHT: 69 IN | BODY MASS INDEX: 35.7 KG/M2 | OXYGEN SATURATION: 97 % | TEMPERATURE: 97 F | DIASTOLIC BLOOD PRESSURE: 68 MMHG | SYSTOLIC BLOOD PRESSURE: 129 MMHG

## 2022-01-20 DIAGNOSIS — Z00.00 MEDICARE ANNUAL WELLNESS VISIT, SUBSEQUENT: Primary | ICD-10-CM

## 2022-01-20 DIAGNOSIS — E66.01 SEVERE OBESITY (BMI 35.0-35.9 WITH COMORBIDITY) (HCC): ICD-10-CM

## 2022-01-20 PROCEDURE — G8427 DOCREV CUR MEDS BY ELIG CLIN: HCPCS | Performed by: INTERNAL MEDICINE

## 2022-01-20 PROCEDURE — G0439 PPPS, SUBSEQ VISIT: HCPCS | Performed by: INTERNAL MEDICINE

## 2022-01-20 PROCEDURE — 1101F PT FALLS ASSESS-DOCD LE1/YR: CPT | Performed by: INTERNAL MEDICINE

## 2022-01-20 PROCEDURE — G8417 CALC BMI ABV UP PARAM F/U: HCPCS | Performed by: INTERNAL MEDICINE

## 2022-01-20 PROCEDURE — G8536 NO DOC ELDER MAL SCRN: HCPCS | Performed by: INTERNAL MEDICINE

## 2022-01-20 PROCEDURE — G9717 DOC PT DX DEP/BP F/U NT REQ: HCPCS | Performed by: INTERNAL MEDICINE

## 2022-01-20 PROCEDURE — G8754 DIAS BP LESS 90: HCPCS | Performed by: INTERNAL MEDICINE

## 2022-01-20 PROCEDURE — 3017F COLORECTAL CA SCREEN DOC REV: CPT | Performed by: INTERNAL MEDICINE

## 2022-01-20 PROCEDURE — G8752 SYS BP LESS 140: HCPCS | Performed by: INTERNAL MEDICINE

## 2022-01-20 NOTE — PROGRESS NOTES
Reviewed record in preparation for visit and have obtained necessary documentation. Maya Flores is a 78 y.o.  male presents today for office visit for   Chief Complaint   Patient presents with    Annual Wellness Visit    Hypertension   . Pt is not fasting. Patient is accompanied by self. I have received verbal consent from Maya Flores to discuss any/all medical information while they are present in the room. Pt is in Room # 611 [facility] preferred language for health care discussion is english/other. Is the patient using any DME equipment during OV? NO    Advance Directive:  1. Do you have an advance directive in place? Patient Reply: NO    2. If not, would you like material regarding how to put one in place? NO      1. \"Have you been to the ER, urgent care clinic since your last visit? Hospitalized since your last visit? \" No    2. \"Have you seen or consulted any other health care providers outside of the [address] [place] [address] since your last visit? \" No     3. For patients aged 39-70: Has the patient had a colonoscopy? No     If the patient is female:    4. For patients aged 41-77: Has the patient had a mammogram within the past 2 years? NA - based on age    11. For patients aged 21-65: Has the patient had a pap smear? NA - based on age    Upcoming Appts  No    VORB: No orders of the defined types were placed in this encounter.  Macrina Villegas MD/Grace Huddleston LPN    Maya Flores is due for:   Health Maintenance Due   Topic    Colorectal Cancer Screening Combo     Flu Vaccine (1)    COVID-19 Vaccine (3 - Booster for Alcantara Peter series)    Medicare Yearly Exam      Health Maintenance reviewed and discussed per provider  Please order/place referral if appropriate.     Requested Prescriptions      No prescriptions requested or ordered in this encounter       Learning Assessment:  Learning Assessment 9/10/2015   PRIMARY LEARNER Patient   HIGHEST LEVEL OF EDUCATION - PRIMARY LEARNER  > 4 YEARS OF COLLEGE   BARRIERS PRIMARY LEARNER NONE   PRIMARY LANGUAGE ENGLISH   LEARNER PREFERENCE PRIMARY VIDEOS   ANSWERED BY pateint   RELATIONSHIP SELF     Depression Screening:  3 most recent PHQ Screens 9/22/2020 8/15/2017   PHQ Not Done - Active Diagnosis of Depression or Bipolar Disorder   Little interest or pleasure in doing things Not at all Not at all   Feeling down, depressed, irritable, or hopeless Not at all Not at all   Total Score PHQ 2 0 0     Abuse Screening:  Abuse Screening Questionnaire 9/22/2020 9/19/2019 9/10/2015   Do you ever feel afraid of your partner? N N N   Are you in a relationship with someone who physically or mentally threatens you? N N N   Is it safe for you to go home? James Zavala     Fall Risk  Fall Risk Assessment, last 12 mths 6/24/2021 9/22/2020 7/14/2020 1/23/2019 8/15/2018 2/15/2018 8/15/2017   Able to walk? Yes Yes Yes Yes Yes Yes Yes   Fall in past 12 months? 1 No No No No No No   Do you feel unsteady? 0 - - - - - -   Are you worried about falling 0 - - - - - -   Is TUG test greater than 12 seconds? 0 - - - - - -   Is the gait abnormal? 0 - - - - - -   Number of falls in past 12 months 1 - - - - - -   Fall with injury?  1 - - - - - -     Recent Travel Screening and Travel History documentation     Travel Screening     No screening recorded since 01/19/22 0000     Travel History   Travel since 12/20/21    No documented travel since 12/20/21

## 2022-01-20 NOTE — PATIENT INSTRUCTIONS
Medicare Wellness Visit, Male    The best way to live healthy is to have a lifestyle where you eat a well-balanced diet, exercise regularly, limit alcohol use, and quit all forms of tobacco/nicotine, if applicable. Regular preventive services are another way to keep healthy. Preventive services (vaccines, screening tests, monitoring & exams) can help personalize your care plan, which helps you manage your own care. Screening tests can find health problems at the earliest stages, when they are easiest to treat. [facility] follows the current, evidence-based guidelines published by the [facility] ([facility]) when recommending preventive services for our patients. Because we follow these guidelines, sometimes recommendations change over time as research supports it. (For example, a prostate screening blood test is no longer routinely recommended for men with no symptoms). Of course, you and your doctor may decide to screen more often for some diseases, based on your risk and co-morbidities (chronic disease you are already diagnosed with). Preventive services for you include:  - Medicare offers their members a free annual wellness visit, which is time for you and your primary care provider to discuss and plan for your preventive service needs. Take advantage of this benefit every year!  -All adults over age 72 should receive the recommended pneumonia vaccines. Current USPSTF guidelines recommend a series of two vaccines for the best pneumonia protection.   -All adults should have a flu vaccine yearly and tetanus vaccine every 10 years.  -All adults age 48 and older should receive the shingles vaccines (series of two vaccines).        -All adults age 38-68 who are overweight should have a diabetes screening test once every three years.   -Other screening tests & preventive services for persons with diabetes include: an eye exam to screen for diabetic retinopathy, a kidney function test, a foot exam, and stricter control over your cholesterol.   -Cardiovascular screening for adults with routine risk involves an electrocardiogram (ECG) at intervals determined by the provider.   -Colorectal cancer screening should be done for adults age 54-65 with no increased risk factors for colorectal cancer. There are a number of acceptable methods of screening for this type of cancer. Each test has its own benefits and drawbacks. Discuss with your provider what is most appropriate for you during your annual wellness visit. The different tests include: colonoscopy (considered the best screening method), a fecal occult blood test, a fecal DNA test, and sigmoidoscopy.  -All adults born between [facility] should be screened once for Hepatitis C.  -An Abdominal Aortic Aneurysm (AAA) Screening is recommended for men age 73-68 who has ever smoked in their lifetime.      Here is a list of your current Health Maintenance items (your personalized list of preventive services) with a due date:  Health Maintenance Due   Topic Date Due    Colorectal Screening  10/13/2016    Yearly Flu Vaccine (1) 09/01/2021

## 2022-01-20 NOTE — PROGRESS NOTES
This is the Subsequent Medicare Annual Wellness Exam, performed 12 months or more after the Initial AWV or the last Subsequent AWV    I have reviewed the patient's medical history in detail and updated the computerized patient record. /68 (BP 1 Location: Left upper arm, BP Patient Position: Sitting, BP Cuff Size: Large adult)   Pulse 68   Temp 97 °F (36.1 °C) (Temporal)   Resp 18   Ht 5' 9\" (1.753 m)   Wt 241 lb (109.3 kg)   SpO2 97%   BMI 35.59 kg/m²       Physical Exam  Vitals and nursing note reviewed. Constitutional:       General: He is not in acute distress. Appearance: Normal appearance. He is well-developed. He is not diaphoretic. HENT:      Head: Normocephalic and atraumatic. Cardiovascular:      Rate and Rhythm: Normal rate and regular rhythm. Pulmonary:      Effort: Pulmonary effort is normal.      Breath sounds: Normal breath sounds. Musculoskeletal:      Right lower leg: No edema. Left lower leg: No edema. Comments: Overall mobility right shoulder looks good. No axillary nodes. Neg Biceps. Neg AC joint   Skin:     General: Skin is warm and dry. Neurological:      General: No focal deficit present. Mental Status: He is alert and oriented to person, place, and time. Psychiatric:         Mood and Affect: Mood normal.         Behavior: Behavior normal.         Assessment/Plan   Education and counseling provided:  Are appropriate based on today's review and evaluation    1. Medicare annual wellness visit, subsequent  2.  Severe obesity (BMI 35.0-35.9 with comorbidity) ([address]     Incidental mention of right shoulder pain    Depression Risk Factor Screening     3 most recent PHQ Screens 1/20/2022   PHQ Not Done -   Little interest or pleasure in doing things Not at all   Feeling down, depressed, irritable, or hopeless Not at all   Total Score PHQ 2 0       Alcohol & Drug Abuse Risk Screen    Do you average more than 1 drink per night or more than 7 drinks a week: Yes    In the past three months have you have had more than 4 drinks containing alcohol on one occasion: No          Functional Ability and Level of Safety    Hearing: The patient wears hearing aids. but states these are not helpful      Activities of Daily Living: The home contains: grab bars  Patient does total self care      Ambulation: with mild difficulty     Fall Risk:  Fall Risk Assessment, last 12 mths 1/20/2022   Able to walk? Yes   Fall in past 12 months? 1   Do you feel unsteady? 0   Are you worried about falling 0   Is TUG test greater than 12 seconds? 0   Is the gait abnormal? 0   Number of falls in past 12 months 1   Fall with injury? 1      Abuse Screen:  Patient is not abused       Cognitive Screening    Has your family/caregiver stated any concerns about your memory: no     Cognitive Screening: Normal - Animal Naming Test    Health Maintenance Due     Health Maintenance Due   Topic Date Due    Colorectal Cancer Screening Combo  10/13/2016    Flu Vaccine (1) 09/01/2021       Patient Care Team   Patient Care Team:  Elieser Cooper MD as PCP - General (Internal Medicine)  Elieser Cooper MD as PCP - [facility] Empaneled Provider  Mina Harada, MD as Consulting Provider (Gastroenterology)  Pedro Urrutia MD as Consulting Provider (Surgery)  Fabian Valdez MD as Consulting Provider (Thoracic Diseases)  Angel Bahena MD as Consulting Provider (Gastroenterology)    History     Patient Active Problem List   Diagnosis Code    Prostate enlargement N40.0    Hypercholesteremia E78.00    Depression F32. A    Gross hematuria R31.0    Hypertrophy of prostate with urinary obstruction and other lower urinary tract symptoms (LUTS) N40.1, N13.8    Dysuria R30.0    Essential hypertension I10    Coronary artery disease involving native heart without angina pectoris I25.10    Advance directive discussed with patient Z70.80    Severe obesity (BMI 35.0-39. 9) E66.01    Essential tremor G25.0     Past Medical History:   Diagnosis Date    Anxiety     ASCVD (arteriosclerotic cardiovascular disease)     Benign essential tremor     BPH (benign prostatic hyperplasia)     Depression     Fall 01/29/2021    Gross hematuria     Hypercholesterolemia     Hypertension     MI ([facility] infarction) ([facility] 2005    Sleep apnea     Wrist fracture, right 01/29/2021    buckle fracture, right distal radius, non-displaced      Past Surgical History:   Procedure Laterality Date    HX COLONOSCOPY  10/17/11    5 yr f/u, Dr. Mason Pals  5/24/16    LIMA to LAD, SVG OM-LPLV, 3 v total    HX CORONARY STENT PLACEMENT  2005    X2 , Dr. Amando Arciniega  2005    HX UROLOGICAL  3/28/13    Cystoscopy, Retrograde Pyelogram, Bladder Biopsy     HX VASECTOMY       Current Outpatient Medications   Medication Sig Dispense Refill    escitalopram oxalate (LEXAPRO) 20 mg tablet Take 1 tablet by mouth once daily 90 Tablet 3    metoprolol tartrate (LOPRESSOR) 50 mg tablet Take 1 tablet by mouth twice daily 180 Tab 1    rosuvastatin (CRESTOR) 10 mg tablet Take 1 tablet by mouth once daily 90 Tab 4    ezetimibe (ZETIA) 10 mg tablet Take 1 tablet by mouth once daily 90 Tab 4    Nitrostat 0.4 mg SL tablet 1 Tab by SubLINGual route every five (5) minutes as needed for Chest Pain. Indications: acute attack of angina 1 Bottle 5    acetaminophen (TYLENOL) 325 mg tablet Take 650 mg by mouth every six (6) hours as needed. Indications: pain      aspirin delayed-release 81 mg tablet Take 81 mg by mouth daily.        No Known Allergies    Family History   Problem Relation Age of Onset    Cancer Mother         Oral    Cancer Maternal Grandfather         Renal Cell    Colon Cancer Paternal Aunt      Social History     Tobacco Use    Smoking status: Former Smoker     Packs/day: 1.00     Years: 28.00     Pack years: 28.00     Types: Cigarettes     Quit date: 1/1/1989 Years since quittin.0    Smokeless tobacco: Never Used   Substance Use Topics    Alcohol use:  Yes     Alcohol/week: 11.7 standard drinks     Types: 14 Glasses of wine per week         Jose Juan Coffey MD

## 2022-03-19 PROBLEM — G25.0 ESSENTIAL TREMOR: Status: ACTIVE | Noted: 2018-08-15

## 2022-03-19 PROBLEM — Z71.89 ADVANCE DIRECTIVE DISCUSSED WITH PATIENT: Status: ACTIVE | Noted: 2017-05-23

## 2022-04-04 ENCOUNTER — PATIENT OUTREACH (OUTPATIENT)
Dept: CASE MANAGEMENT | Age: 77
End: 2022-04-04

## 2022-04-04 NOTE — PROGRESS NOTES
Care Transitions Initial Call    Call within 2 business days of discharge: Yes     Patient: [place] Patient : 1945 MRN: [id number]    Was this an external facility discharge? Yes, 2022 - 4/3/2022 Discharge Facility: [address] [place] [address]  For Chest Pain. Challenges to be reviewed by the provider   Additional needs identified to be addressed with provider: no  none         Method of communication with provider : none    Discussed COVID-19 related testing which was not done at this time. Test results were not done. Patient informed of results, if available? Noted not done at this time. Advance Care Planning:   Does patient have an Advance Directive:  no ACP noted on file at this time. Inpatient Readmission Risk score: No data recorded  Was this a readmission? no   Patient stated reason for the admission: Chest pain    Patients top risk factors for readmission: Chronic medical conditions     Interventions to address risk factors: closely follow up with PCP. Continue to take all medications as prescribed. Care Transition Nurse (CTN) contacted the patient by telephone to perform post hospital discharge assessment. Verified name and  with patient as identifiers. Provided introduction to self, and explanation of the CTN role. Patient reports that he is doing well. No new or worsening of symptoms reported by Pt. At this time. No questions, concerns and/or needs at this time as per Pt. CTN reviewed discharge instructions, medical action plan and red flags with patient who verbalized understanding. Were discharge instructions available to patient? yes. Reviewed appropriate site of care based on symptoms and resources available to patient including: PCP and When to call 911. Patient given an opportunity to ask questions and does not have any further questions or concerns at this time.  The patient agrees to contact the PCP office for questions related to their healthcare. Medication reconciliation was performed with patient, who verbalizes understanding of administration of home medications. Advised obtaining a 90-day supply of all daily and as-needed medications. Referral to Pharm D needed: Will defer with Pt. PCP. Home Health/Outpatient orders at discharge: none noted and none as per Pt. Durable Medical Equipment ordered at discharge: None    Was patient discharged with a pulse oximeter? no.     Discussed follow-up appointments. If no appointment was previously scheduled, appointment scheduling offered: yes. Is follow up appointment scheduled within 7 days of discharge? no. PCP apt. Request sent. [id number] follow up appointment(s):   Future Appointments   Date Time Provider James Knox   7/20/2022  4:00 PM Tha Doyle MD GMA BS [facility]     Non-BS follow up appointment(s): none noted at this time. Plan for follow-up call in 7-10 days based on severity of symptoms and risk factors. Plan for next call: Symptoms, PCP apt. ACP,assess for any needs. CTN provided contact information for future needs. Goals Addressed                 This Visit's Progress     Prevent complications post hospitalization. Patient will have PCP apt. Within 7 days post hospital discharged. Patient will continue to take all medications as prescribed. Patient will call CTN or PCP office for any questions, concerns and/or needs.  Understands red flags post discharge. Pt. Will go to the nearest emergency room for chest pain, shortness of breath, returning of symptoms that brought him to the emergency room and/or worsening of symptoms.

## 2022-04-05 ENCOUNTER — NURSE TRIAGE (OUTPATIENT)
Dept: OTHER | Facility: CLINIC | Age: 77
End: 2022-04-05

## 2022-04-05 NOTE — TELEPHONE ENCOUNTER
Received call from Raf6 Geoff  at [facility] with Red Flag Complaint. **Limited triage due to patient not being present during call**    Subjective: Caller states \"shortness of breath\"     Current Symptoms: Recently d/c'd from hospital after having CP and calling 911, multiple work ups negative for cardiac and referred to PCP for follow up. New onset SOB after discharged home. Worse with moderate exertion, resolves at rest. Has only occurred twice since Sunday night. Per spouse, patient is at work, in court, unable to be reached. Denies swelling to extremities or distress today before leaving the house. Onset: Sunday    Associated Symptoms: NA    Pain Severity: 0/10    Temperature: Denies    What has been tried: Nothing    LMP: NA Pregnant: NA    Recommended disposition: See PCP within 3 Days    Care advice provided, patient verbalizes understanding; denies any other questions or concerns; instructed to call back for any new or worsening symptoms. Patient/Caller agrees with recommended disposition; writer provided warm transfer to [facility] for appointment scheduling    Attention Provider: Thank you for allowing me to participate in the care of your patient. The patient was connected to triage in response to information provided to the [facility]. Please do not respond through this encounter as the response is not directed to a shared pool.     Reason for Disposition   MODERATE longstanding difficulty breathing (e.g., speaks in phrases, SOB even at rest, pulse 100-120) and SAME as normal    Protocols used: BREATHING DIFFICULTY-ADULT-OH

## 2022-04-12 ENCOUNTER — PATIENT OUTREACH (OUTPATIENT)
Dept: CASE MANAGEMENT | Age: 77
End: 2022-04-12

## 2022-04-12 NOTE — PROGRESS NOTES
Care Transitions Follow Up Call    CTN Attempt to contact patient via telephone on 4/12/22 for post hospital follow up. Unable to reach. Left message on voicemail with office contact information. No Patient medical information left on message.     [facility] follow up appointment(s):   Future Appointments   Date Time Provider James Knox   4/18/2022  3:00 PM Gurdeep Noel MD [facility]MA BS AMB   7/20/2022  4:00 PM Sonia Wilson MD [facility]MA BS AMB

## 2022-04-18 ENCOUNTER — OFFICE VISIT (OUTPATIENT)
Dept: INTERNAL MEDICINE CLINIC | Age: 77
End: 2022-04-18
Payer: MEDICARE

## 2022-04-18 VITALS
SYSTOLIC BLOOD PRESSURE: 135 MMHG | DIASTOLIC BLOOD PRESSURE: 76 MMHG | HEART RATE: 63 BPM | TEMPERATURE: 97.2 F | WEIGHT: 243 LBS | BODY MASS INDEX: 35.99 KG/M2 | OXYGEN SATURATION: 99 % | HEIGHT: 69 IN | RESPIRATION RATE: 18 BRPM

## 2022-04-18 DIAGNOSIS — R07.89 ATYPICAL CHEST PAIN: Primary | ICD-10-CM

## 2022-04-18 DIAGNOSIS — L98.9 SKIN LESION OF CHEEK: ICD-10-CM

## 2022-04-18 DIAGNOSIS — N40.1 BENIGN PROSTATIC HYPERPLASIA WITH LOWER URINARY TRACT SYMPTOMS, SYMPTOM DETAILS UNSPECIFIED: ICD-10-CM

## 2022-04-18 PROCEDURE — G8427 DOCREV CUR MEDS BY ELIG CLIN: HCPCS | Performed by: INTERNAL MEDICINE

## 2022-04-18 PROCEDURE — 99495 TRANSJ CARE MGMT MOD F2F 14D: CPT | Performed by: INTERNAL MEDICINE

## 2022-04-18 RX ORDER — OMEPRAZOLE 40 MG/1
40 CAPSULE, DELAYED RELEASE ORAL DAILY
Qty: 30 CAPSULE | Refills: 1 | Status: SHIPPED | OUTPATIENT
Start: 2022-04-18 | End: 2022-08-09 | Stop reason: SDUPTHER

## 2022-04-18 NOTE — PROGRESS NOTES
HISTORY OF PRESENT ILLNESS  Nohemi Barboza is a 78 y.o. male. /76 (BP 1 Location: Left upper arm, BP Patient Position: Sitting, BP Cuff Size: Large adult)   Pulse 63   Temp 97.2 °F (36.2 °C) (Temporal)   Resp 18   Ht 5' 9\" (1.753 m)   Wt 243 lb (110.2 kg)   SpO2 99%   BMI 35.88 kg/m²     He spent overnight 4/2-4/3/22 at [facility] with chest pains. (observation/short stay)  It was substernal and sharp  Lab and Stress test were negative. Chest CTA was negative  They suggested it was esophagus    He now reports he can't eat as much as he used to. Less appetite than he had  But no dysphagia    He has less pain initially, then today it returned    Same exact place in the sternum  It is sharp and can last all day    He has not been back to his cardiologist    ED Follow-up  The history is provided by the patient. This is a new problem. Associated symptoms include chest pain (did not radiate. ) and shortness of breath (pt says it hurt to take a deep breath). Pertinent negatives include no headaches. Review of Systems   Constitutional: Negative for chills, diaphoresis and fever. Respiratory: Positive for shortness of breath (pt says it hurt to take a deep breath). Negative for sputum production and wheezing. Cardiovascular: Positive for chest pain (did not radiate. ). Negative for palpitations. Gastrointestinal: Negative for heartburn. Genitourinary: Positive for frequency. Nocturia. Some leakage. Some trouble getting stream started and with complete emptying and stopping. Some dribble   Neurological: Negative for dizziness and headaches. Physical Exam  HENT:      Head:           ASSESSMENT and PLAN    ICD-10-CM ICD-9-CM    1. Atypical chest pain  R07.89 786.59 REFERRAL TO GASTROENTEROLOGY      XR UPPER GI SERIES W KUB      omeprazole (PRILOSEC) 40 mg capsule   2.  Benign prostatic hyperplasia with lower urinary tract symptoms, symptom details unspecified  N40.1 600.01 REFERRAL TO UROLOGY 3. Skin lesion of cheek  L98.9 709.9      Available hospital/ER record reviewed    Chest pain. Unlikely cardiac given recent evaluation. ? Esophageal spasms vs other    Will request UGI  And refer back to Dr Mendoza Arm  Start trial of omeprazole    Incidentally refer back to urology. Pt with known BPH and would like to discuss tx options    Advised to f/u with derm for the 2 black facial lesions.     F/u here as appt in July

## 2022-04-18 NOTE — PROGRESS NOTES
1. \"Have you been to the ER, urgent care clinic since your last visit? Hospitalized since your last visit? \" Yes H[facility]ctor Ramirez for chest pain    2. \"Have you seen or consulted any other health care providers outside of the [address] [place] [address] since your last visit? \" No     3. For patients aged 39-70: Has the patient had a colonoscopy / FIT/ Cologuard? No      If the patient is female:    4. For patients aged 41-77: Has the patient had a mammogram within the past 2 years? NA - based on age or sex       11. For patients aged 21-65: Has the patient had a pap smear?  NA - based on age or sex

## 2022-05-18 ENCOUNTER — DOCUMENTATION ONLY (OUTPATIENT)
Dept: INTERNAL MEDICINE CLINIC | Age: 77
End: 2022-05-18

## 2022-05-18 DIAGNOSIS — Z76.0 MEDICATION REFILL: ICD-10-CM

## 2022-05-18 NOTE — PROGRESS NOTES
[address]  Outside Information             UGI W/ AIR CONTRAST W/ KUB    Anatomical Region Laterality Modality   Abdomen -- Radio Fluoroscopy       Impression  Performed by RADIOLOGY      1. Moderate spontaneous reflux to the midthoracic level. 2. Poor coating of gastric mucosa. The patient apparently did not have anything to drink for at least 8 hours. Therefore, this finding raises question of gastric hypersecretion. Signed By: Jayda Goode MD on 5/16/2022 4:19 PM    Narrative  Performed by RADIOLOGY  EXAM: UGI W/ AIR CONTRAST W/ KUB     CLINICAL INDICATION/HISTORY: Provided with order -  Q53.99: Other chest pain     > Additional: Chest and epigastric pain. COMPARISON: No prior studies     > Correlative imaging: None. TECHNIQUE: The patient orally ingested barium and carbonated powder under direct fluoroscopic observation in order to perform a double contrast upper GI study.  Multiple digital fluoroscopic images were obtained with the patient in various positions. Radiation dose information:   --Fluoroscopy dose (reference air kerma), in mGy: 427       _______________     FINDINGS:      VIEW: Unremarkable. Incidentally noted evidence of cardiac surgery with at least one coronary stent in situ. ESOPHAGUS:     > Motility: Satisfactory.     > Caliber: Normal.     > Mucosa: Normal.     > Hiatal hernia: None.     > Reflux: Moderate spontaneous reflux to the midthoracic level. STOMACH: Limited evaluation due to poor coating of gastric mucosa. There are no fixed stenotic lesions. However, visualized rugal folds are normal in caliber. DUODENUM: Normal in appearance, with no fold thickening, mass lesion or ulceration. _______________    Procedure Note    Stacie Hunt MD - 05/16/2022   Formatting of this note might be different from the original.   EXAM: UGI W/ AIR CONTRAST W/ KUB     CLINICAL INDICATION/HISTORY: Provided with order -  R07.89:  Other chest pain     > Additional: Chest and epigastric pain. COMPARISON: No prior studies     > Correlative imaging: None. TECHNIQUE: The patient orally ingested barium and carbonated powder under direct fluoroscopic observation in order to perform a double contrast upper GI study.  Multiple digital fluoroscopic images were obtained with the patient in various positions. Radiation dose information:   --Fluoroscopy dose (reference air kerma), in mGy: 427       _______________     FINDINGS:      VIEW: Unremarkable. Incidentally noted evidence of cardiac surgery with at least one coronary stent in situ. ESOPHAGUS:     > Motility: Satisfactory.     > Caliber: Normal.     > Mucosa: Normal.     > Hiatal hernia: None.     > Reflux: Moderate spontaneous reflux to the midthoracic level. STOMACH: Limited evaluation due to poor coating of gastric mucosa. There are no fixed stenotic lesions. However, visualized rugal folds are normal in caliber. DUODENUM: Normal in appearance, with no fold thickening, mass lesion or ulceration. _______________     IMPRESSION       1. Moderate spontaneous reflux to the midthoracic level. 2. Poor coating of gastric mucosa. The patient apparently did not have anything to drink for at least 8 hours. Therefore, this finding raises question of gastric hypersecretion.      Signed By: Kristi Chapman MD on 5/16/2022 4:19 PM  Specimen Collected: 05/16/22  4:15 PM Last Resulted: 05/16/22  4:19 PM   Received From: [address]  Result Received: 05/18/22  6:57 PM   View Encounter

## 2022-05-19 ENCOUNTER — PATIENT OUTREACH (OUTPATIENT)
Dept: CASE MANAGEMENT | Age: 77
End: 2022-05-19

## 2022-05-19 RX ORDER — METOPROLOL TARTRATE 50 MG/1
50 TABLET ORAL 2 TIMES DAILY
Qty: 180 TABLET | Refills: 1 | Status: SHIPPED | OUTPATIENT
Start: 2022-05-19

## 2022-05-19 NOTE — PROGRESS NOTES
Patient has graduated from the Transitions of Care Coordination  program on 5/19/22. Patient reports that he is doing well. No more Chest pain as per Pt. No new or worsening of symptoms reported by Pt. At this time. No questions, concerns and/or needs at this time as per Pt. Patient's symptoms are stable at this time. Patient/family has the ability to self-manage. Care management goals have been completed at this time. No further care transitions nurse follow up scheduled. Patient has care transitions nurse contact information for any further questions, concerns, or needs.     This episode is closed and resolved    Patient's upcoming visits:    Future Appointments   Date Time Provider James Knox   6/9/2022 11:45 AM MD Maria Brown   7/20/2022  4:00 PM Norma Wilson MD [facility]

## 2022-07-13 DIAGNOSIS — R07.89 ATYPICAL CHEST PAIN: ICD-10-CM

## 2022-08-01 ENCOUNTER — HOSPITAL ENCOUNTER (OUTPATIENT)
Dept: LAB | Age: 77
Discharge: HOME | End: 2022-08-01
Payer: MEDICARE

## 2022-08-01 ENCOUNTER — OFFICE VISIT (OUTPATIENT)
Dept: INTERNAL MEDICINE CLINIC | Age: 77
End: 2022-08-01
Payer: MEDICARE

## 2022-08-01 VITALS
RESPIRATION RATE: 18 BRPM | DIASTOLIC BLOOD PRESSURE: 85 MMHG | OXYGEN SATURATION: 94 % | HEART RATE: 68 BPM | HEIGHT: 69 IN | BODY MASS INDEX: 35.49 KG/M2 | TEMPERATURE: 97 F | SYSTOLIC BLOOD PRESSURE: 133 MMHG | WEIGHT: 239.6 LBS

## 2022-08-01 DIAGNOSIS — E78.00 HYPERCHOLESTEREMIA: ICD-10-CM

## 2022-08-01 DIAGNOSIS — R07.89 ATYPICAL CHEST PAIN: ICD-10-CM

## 2022-08-01 DIAGNOSIS — I10 ESSENTIAL HYPERTENSION: ICD-10-CM

## 2022-08-01 DIAGNOSIS — Z78.9 ALCOHOL CONSUMPTION OF ONE TO FOUR DRINKS PER DAY: ICD-10-CM

## 2022-08-01 DIAGNOSIS — Z79.899 MEDICATION MANAGEMENT: ICD-10-CM

## 2022-08-01 DIAGNOSIS — R73.9 ELEVATED BLOOD SUGAR LEVEL: ICD-10-CM

## 2022-08-01 DIAGNOSIS — R10.13 DYSPEPSIA: Primary | ICD-10-CM

## 2022-08-01 LAB
ALBUMIN SERPL-MCNC: 3.4 G/DL (ref 3.4–5)
ALBUMIN/GLOB SERPL: 1.2 {RATIO} (ref 0.8–1.7)
ALP SERPL-CCNC: 82 U/L (ref 45–117)
ALT SERPL-CCNC: 26 U/L (ref 16–61)
ANION GAP SERPL CALC-SCNC: 5 MMOL/L (ref 3–18)
AST SERPL-CCNC: 19 U/L (ref 10–38)
BILIRUB SERPL-MCNC: 1 MG/DL (ref 0.2–1)
BUN SERPL-MCNC: 15 MG/DL (ref 7–18)
BUN/CREAT SERPL: 17 (ref 12–20)
CALCIUM SERPL-MCNC: 8.7 MG/DL (ref 8.5–10.1)
CHLORIDE SERPL-SCNC: 107 MMOL/L (ref 100–111)
CHOLEST SERPL-MCNC: 140 MG/DL
CO2 SERPL-SCNC: 29 MMOL/L (ref 21–32)
CREAT SERPL-MCNC: 0.86 MG/DL (ref 0.6–1.3)
GLOBULIN SER CALC-MCNC: 2.9 G/DL (ref 2–4)
GLUCOSE SERPL-MCNC: 105 MG/DL (ref 74–99)
HBA1C MFR BLD: 5.6 % (ref 4.2–5.6)
HDLC SERPL-MCNC: 53 MG/DL (ref 40–60)
HDLC SERPL: 2.6 {RATIO} (ref 0–5)
LDLC SERPL CALC-MCNC: 53.6 MG/DL (ref 0–100)
LIPID PROFILE,FLP: ABNORMAL
POTASSIUM SERPL-SCNC: 4.7 MMOL/L (ref 3.5–5.5)
PROT SERPL-MCNC: 6.3 G/DL (ref 6.4–8.2)
SODIUM SERPL-SCNC: 141 MMOL/L (ref 136–145)
TRIGL SERPL-MCNC: 167 MG/DL (ref ?–150)
VLDLC SERPL CALC-MCNC: 33.4 MG/DL

## 2022-08-01 PROCEDURE — 99214 OFFICE O/P EST MOD 30 MIN: CPT | Performed by: INTERNAL MEDICINE

## 2022-08-01 PROCEDURE — 1101F PT FALLS ASSESS-DOCD LE1/YR: CPT | Performed by: INTERNAL MEDICINE

## 2022-08-01 PROCEDURE — G8536 NO DOC ELDER MAL SCRN: HCPCS | Performed by: INTERNAL MEDICINE

## 2022-08-01 PROCEDURE — G8752 SYS BP LESS 140: HCPCS | Performed by: INTERNAL MEDICINE

## 2022-08-01 PROCEDURE — 83036 HEMOGLOBIN GLYCOSYLATED A1C: CPT

## 2022-08-01 PROCEDURE — G9717 DOC PT DX DEP/BP F/U NT REQ: HCPCS | Performed by: INTERNAL MEDICINE

## 2022-08-01 PROCEDURE — 3017F COLORECTAL CA SCREEN DOC REV: CPT | Performed by: INTERNAL MEDICINE

## 2022-08-01 PROCEDURE — 1123F ACP DISCUSS/DSCN MKR DOCD: CPT | Performed by: INTERNAL MEDICINE

## 2022-08-01 PROCEDURE — G8427 DOCREV CUR MEDS BY ELIG CLIN: HCPCS | Performed by: INTERNAL MEDICINE

## 2022-08-01 PROCEDURE — 80053 COMPREHEN METABOLIC PANEL: CPT

## 2022-08-01 PROCEDURE — 36415 COLL VENOUS BLD VENIPUNCTURE: CPT

## 2022-08-01 PROCEDURE — 80061 LIPID PANEL: CPT

## 2022-08-01 PROCEDURE — G8754 DIAS BP LESS 90: HCPCS | Performed by: INTERNAL MEDICINE

## 2022-08-01 PROCEDURE — G8417 CALC BMI ABV UP PARAM F/U: HCPCS | Performed by: INTERNAL MEDICINE

## 2022-08-01 NOTE — PROGRESS NOTES
1. \"Have you been to the ER, urgent care clinic since your last visit? Hospitalized since your last visit? \" No    2. \"Have you seen or consulted any other health care providers outside of the [address] [place] [address] since your last visit? \" No     3. For patients aged 39-70: Has the patient had a colonoscopy / FIT/ Cologuard? No      If the patient is female:    4. For patients aged 41-77: Has the patient had a mammogram within the past 2 years? NA - based on age or sex      11. For patients aged 21-65: Has the patient had a pap smear?  NA - based on age or sex

## 2022-08-01 NOTE — PROGRESS NOTES
HISTORY OF PRESENT ILLNESS  Ifeoma Flores is a 78 y.o. male. /85 (BP 1 Location: Left upper arm, BP Patient Position: Sitting, BP Cuff Size: Adult)   Pulse 68   Temp 97 °F (36.1 °C) (Temporal)   Resp 18   Ht 5' 9\" (1.753 m)   Wt 239 lb 9.6 oz (108.7 kg)   SpO2 94%   BMI 35.38 kg/m²     He retired  (did not work in )      He would like to discuss his alcohol intake. His wife is concerned. He drinks 4 per night. Denies binging. Denies drinking to get drunk  His sister  of pancreatic cancer last . She was 67. She had been a drinker  So now his wife thinks he is at risk for pancreatic cancer or cirrhosis. He has not been without alcohol for at least a year  He had one hospitalization in the last couple of years for a couple of days and di not have any problems during that time  Not sure if he has ever had withdrawal  Drinks in the evening to relax and go to sleep  Has never really tried to stop. Has a colonoscopy coming up and will also be seeing urology    Cholesterol Problem  The history is provided by the Patient. This is a chronic problem. The current episode started more than 1 week ago. The problem occurs daily. Pertinent negatives include no abdominal pain. Chest pain: none recently. Hypertension   The history is provided by the Patient. This is a chronic problem. The current episode started more than 1 week ago. The problem has not changed since onset. Chest pain: none recently. Review of Systems   Constitutional:  Negative for chills and fever. HENT: Negative. Respiratory: Negative. Cardiovascular:  Chest pain: none recently. Gastrointestinal:  Positive for heartburn (dyspepsia which has lead to GI w/u). Negative for abdominal pain, blood in stool, constipation and diarrhea. Neurological: Negative. Psychiatric/Behavioral:  The patient has insomnia (difficulty getting to sleep and sleeping well). Physical Exam  Vitals and nursing note reviewed. Constitutional:       General: He is not in acute distress. Appearance: Normal appearance. He is well-developed. He is not diaphoretic. HENT:      Head: Normocephalic and atraumatic. Cardiovascular:      Rate and Rhythm: Normal rate and regular rhythm. Pulmonary:      Effort: Pulmonary effort is normal.      Breath sounds: Normal breath sounds. Abdominal:      General: There is no distension. Palpations: Abdomen is soft. Tenderness: There is no abdominal tenderness. Comments: Diastasis recti   Musculoskeletal:      Right lower leg: No edema. Left lower leg: No edema. Skin:     General: Skin is warm and dry. Comments: Dry skin. Freckling from sun exposure   Neurological:      General: No focal deficit present. Mental Status: He is alert and oriented to person, place, and time. Psychiatric:         Mood and Affect: Mood normal.         Behavior: Behavior normal.      Comments: Looks more relaxed       ASSESSMENT and PLAN    ICD-10-CM ICD-9-CM    1. Dyspepsia  R10.13 536.8 US ABD COMP      2. Atypical chest pain  R07.89 786.59       3. Alcohol consumption of one to four drinks per day  Z78.9 V69.8       4. Medication management  Z79.899 V58.69       5. Essential hypertension  Y98 158.1 METABOLIC PANEL, COMPREHENSIVE      LIPID PANEL      6. Hypercholesteremia  E78.00 272.0 LIPID PANEL      7. Elevated blood sugar level  R73.9 790.29 HEMOGLOBIN A1C W/O EAG        Last CT abdomen done in 2018 was negative  Given his chest pain that was felt to be non cardiac, will check abd u/s. Which will also give us some idea if there are any liver changes or signs of pancreas damage  Discussed his alcohol consumption  Recommend cutting down to 2 per day. Can use OTC sleep aid such as benadryl for night    Discussed lexapro taper at some point as well especially now that retired.     F/u 2-3 weeks to discuss progress and whether needs additional assistance from benzodiazepine or other medication for the alcohol. Biggest risk is withdrawal sxs.  He will have to have some discipline as we taper

## 2022-08-09 DIAGNOSIS — R07.89 ATYPICAL CHEST PAIN: ICD-10-CM

## 2022-08-10 RX ORDER — OMEPRAZOLE 40 MG/1
40 CAPSULE, DELAYED RELEASE ORAL DAILY
Qty: 30 CAPSULE | Refills: 1 | Status: SHIPPED | OUTPATIENT
Start: 2022-08-10 | End: 2022-11-03

## 2022-08-28 DIAGNOSIS — Z76.0 MEDICATION REFILL: ICD-10-CM

## 2022-08-30 RX ORDER — EZETIMIBE 10 MG/1
10 TABLET ORAL DAILY
Qty: 90 TABLET | Refills: 1 | Status: SHIPPED | OUTPATIENT
Start: 2022-08-30

## 2022-09-06 DIAGNOSIS — Z76.0 MEDICATION REFILL: ICD-10-CM

## 2022-09-07 RX ORDER — ESCITALOPRAM OXALATE 20 MG/1
20 TABLET ORAL DAILY
Qty: 90 TABLET | Refills: 3 | Status: SHIPPED | OUTPATIENT
Start: 2022-09-07 | End: 2022-11-04

## 2022-10-21 DIAGNOSIS — R10.13 DYSPEPSIA: ICD-10-CM

## 2022-10-26 DIAGNOSIS — G47.00 INSOMNIA, UNSPECIFIED TYPE: Primary | ICD-10-CM

## 2022-10-26 RX ORDER — ZOLPIDEM TARTRATE 5 MG/1
5 TABLET ORAL
Qty: 30 TABLET | Refills: 5 | Status: SHIPPED | OUTPATIENT
Start: 2022-10-26

## 2022-10-26 NOTE — PROGRESS NOTES
Orders Placed This Encounter    zolpidem (AMBIEN) 5 mg tablet     Sig: Take 1 Tablet by mouth nightly as needed for Sleep. Max Daily Amount: 5 mg.      Dispense:  30 Tablet     Refill:  5

## 2023-01-17 ENCOUNTER — OFFICE VISIT (OUTPATIENT)
Dept: INTERNAL MEDICINE CLINIC | Age: 78
End: 2023-01-17
Payer: MEDICARE

## 2023-01-17 VITALS
SYSTOLIC BLOOD PRESSURE: 116 MMHG | HEIGHT: 69 IN | TEMPERATURE: 97 F | DIASTOLIC BLOOD PRESSURE: 69 MMHG | OXYGEN SATURATION: 98 % | BODY MASS INDEX: 34.66 KG/M2 | WEIGHT: 234 LBS | HEART RATE: 69 BPM | RESPIRATION RATE: 16 BRPM

## 2023-01-17 DIAGNOSIS — Z00.00 MEDICARE ANNUAL WELLNESS VISIT, SUBSEQUENT: Primary | ICD-10-CM

## 2023-01-17 DIAGNOSIS — G47.00 INSOMNIA, UNSPECIFIED TYPE: ICD-10-CM

## 2023-01-17 DIAGNOSIS — I10 ESSENTIAL HYPERTENSION: ICD-10-CM

## 2023-01-17 PROCEDURE — G9717 DOC PT DX DEP/BP F/U NT REQ: HCPCS | Performed by: INTERNAL MEDICINE

## 2023-01-17 PROCEDURE — G8417 CALC BMI ABV UP PARAM F/U: HCPCS | Performed by: INTERNAL MEDICINE

## 2023-01-17 PROCEDURE — 3074F SYST BP LT 130 MM HG: CPT | Performed by: INTERNAL MEDICINE

## 2023-01-17 PROCEDURE — 99213 OFFICE O/P EST LOW 20 MIN: CPT | Performed by: INTERNAL MEDICINE

## 2023-01-17 PROCEDURE — G8536 NO DOC ELDER MAL SCRN: HCPCS | Performed by: INTERNAL MEDICINE

## 2023-01-17 PROCEDURE — 1123F ACP DISCUSS/DSCN MKR DOCD: CPT | Performed by: INTERNAL MEDICINE

## 2023-01-17 PROCEDURE — G0439 PPPS, SUBSEQ VISIT: HCPCS | Performed by: INTERNAL MEDICINE

## 2023-01-17 PROCEDURE — G8427 DOCREV CUR MEDS BY ELIG CLIN: HCPCS | Performed by: INTERNAL MEDICINE

## 2023-01-17 PROCEDURE — 3017F COLORECTAL CA SCREEN DOC REV: CPT | Performed by: INTERNAL MEDICINE

## 2023-01-17 PROCEDURE — 3078F DIAST BP <80 MM HG: CPT | Performed by: INTERNAL MEDICINE

## 2023-01-17 PROCEDURE — 1101F PT FALLS ASSESS-DOCD LE1/YR: CPT | Performed by: INTERNAL MEDICINE

## 2023-01-17 RX ORDER — ZOLPIDEM TARTRATE 10 MG/1
10 TABLET ORAL
Qty: 30 TABLET | Refills: 5 | Status: SHIPPED | OUTPATIENT
Start: 2023-01-17

## 2023-01-17 NOTE — PROGRESS NOTES
HISTORY OF PRESENT ILLNESS  Noemi Sales is a 78 y.o. male. /69 (BP 1 Location: Left upper arm, BP Patient Position: Sitting, BP Cuff Size: Large adult)   Pulse 69   Temp 97 °F (36.1 °C) (Temporal)   Resp 16   Ht 5' 9\" (1.753 m)   Wt 234 lb (106.1 kg)   SpO2 98%   BMI 34.56 kg/m²       Now retired  He is working at [employer] 2 days a week to keep busy      Would like to discuss [place]  He has trouble getting to sleep and does not think the 5 mg dose is enough. He does get up some at night due to prostate issues        Sleep Problem  The history is provided by the Patient. This is a chronic problem. The current episode started more than 1 week ago. The problem occurs daily. Review of Systems   Constitutional: Negative. Respiratory: Negative. Cardiovascular: Negative. Psychiatric/Behavioral:  The patient has insomnia. Physical Exam  Vitals and nursing note reviewed. Constitutional:       General: He is not in acute distress. Appearance: Normal appearance. He is well-developed. He is not diaphoretic. HENT:      Head: Normocephalic and atraumatic. Cardiovascular:      Rate and Rhythm: Normal rate and regular rhythm. Pulmonary:      Effort: Pulmonary effort is normal.      Breath sounds: Normal breath sounds. Musculoskeletal:      Right lower leg: No edema. Left lower leg: No edema. Skin:     General: Skin is warm and dry. Neurological:      Mental Status: He is alert and oriented to person, place, and time. Psychiatric:         Mood and Affect: Mood normal.         Behavior: Behavior normal.       ASSESSMENT and PLAN    ICD-10-CM ICD-9-CM              2. Insomnia, unspecified type  G47.00 780.52 zolpidem (AMBIEN) 10 mg tablet      3. Essential hypertension  I10 401.9             After discussion, will increase the ambien to 10 mg nightly. Advised technically it is above recommended dose.   Then consider CR if necessary  BP looks good today  F/u 6 months for sleep issue, chol recheck, CAD, BP

## 2023-01-17 NOTE — PROGRESS NOTES
Reviewed record in preparation for visit and have obtained necessary documentation. Angelic Salmeron is a 78 y.o.  male presents today for office visit for   Chief Complaint   Patient presents with    Sleep Problem   . Pt is  fasting. Patient is accompanied by self. I have received verbal consent from Angelic Salmeron to discuss any/all medical information while they are present in the room. Pt is in Room # [address] preferred language for health care discussion is english/other. Is the patient using any DME equipment during OV? NO    Advance Directive:  1. Do you have an advance directive in place? Patient Reply: NO    2. If not, would you like material regarding how to put one in place? NO      1. \"Have you been to the ER, urgent care clinic since your last visit? Hospitalized since your last visit? \" No    2. \"Have you seen or consulted any other health care providers outside of the [address] [place] [address] since your last visit? \" Yes Urology      3. For patients aged 39-70: Has the patient had a colonoscopy? NA - based on age     If the patient is female:    4. For patients aged 41-77: Has the patient had a mammogram within the past 2 years? NA - based on age    11. For patients aged 21-65: Has the patient had a pap smear? NA - based on age    Upcoming Appts  Yes Urology    VORB: No orders of the defined types were placed in this encounter.  Sofia Hinton MD/Grace Jimenez , LPN    Angelic Salmeron is due for:   Health Maintenance Due   Topic    Shingles Vaccine (1 of 2)    DTaP/Tdap/Td series (1 - Tdap)    Colorectal Cancer Screening Combo     COVID-19 Vaccine (4 - Booster for Pfizer series)    Flu Vaccine (1)    Medicare Yearly Exam      Health Maintenance reviewed and discussed per provider  Please order/place referral if appropriate.     Requested Prescriptions      No prescriptions requested or ordered in this encounter       Learning Assessment:  Learning Assessment 9/10/2015   PRIMARY LEARNER Patient   HIGHEST LEVEL OF EDUCATION - PRIMARY LEARNER  > 4 YEARS OF COLLEGE   BARRIERS PRIMARY LEARNER NONE   PRIMARY LANGUAGE ENGLISH   LEARNER PREFERENCE PRIMARY VIDEOS   ANSWERED BY pateint   RELATIONSHIP SELF     Depression Screening:  3 most recent [facility] Screens 8/1/2022 4/18/2022 1/20/2022 9/22/2020 8/15/2017   PHQ Not Done - - - - Active Diagnosis of Depression or Bipolar Disorder   Little interest or pleasure in doing things Not at all Not at all Not at all Not at all Not at all   Feeling down, depressed, irritable, or hopeless Not at all Not at all Not at all Not at all Not at all   Total Score PHQ 2 0 0 0 0 0     Abuse Screening:  Abuse Screening Questionnaire 1/20/2022 9/22/2020 9/19/2019 9/10/2015   Do you ever feel afraid of your partner? N N N N   Are you in a relationship with someone who physically or mentally threatens you? N N N N   Is it safe for you to go home? Maribel Amin     Fall Risk  Fall Risk Assessment, last 12 mths 1/20/2022 6/24/2021 9/22/2020 7/14/2020 1/23/2019 8/15/2018 2/15/2018   Able to walk? Yes Yes Yes Yes Yes Yes Yes   Fall in past 12 months? 1 1 No No No No No   Do you feel unsteady? 0 0 - - - - -   Are you worried about falling 0 0 - - - - -   Is TUG test greater than 12 seconds? 0 0 - - - - -   Is the gait abnormal? 0 0 - - - - -   Number of falls in past 12 months 1 1 - - - - -   Fall with injury?  1 1 - - - - -     Recent Travel Screening and Travel History documentation     Travel Screening     No screening recorded since 01/16/23 0000       Travel History   Travel since 12/17/22    No documented travel since 12/17/22

## 2023-01-17 NOTE — PATIENT INSTRUCTIONS
Medicare Wellness Visit, Male    The best way to live healthy is to have a lifestyle where you eat a well-balanced diet, exercise regularly, limit alcohol use, and quit all forms of tobacco/nicotine, if applicable. Regular preventive services are another way to keep healthy. Preventive services (vaccines, screening tests, monitoring & exams) can help personalize your care plan, which helps you manage your own care. Screening tests can find health problems at the earliest stages, when they are easiest to treat. [facility] follows the current, evidence-based guidelines published by the [facility] Moy Kinza ([facility]) when recommending preventive services for our patients. Because we follow these guidelines, sometimes recommendations change over time as research supports it. (For example, a prostate screening blood test is no longer routinely recommended for men with no symptoms). Of course, you and your doctor may decide to screen more often for some diseases, based on your risk and co-morbidities (chronic disease you are already diagnosed with). Preventive services for you include:  - Medicare offers their members a free annual wellness visit, which is time for you and your primary care provider to discuss and plan for your preventive service needs.  Take advantage of this benefit every year!    -Over the age of 72 should receive the recommended pneumonia vaccines.   -All adults should have a flu vaccine yearly.  -All adults should receive a tetanus vaccine every 10 years.  -Over the age of 48 should receive the shingles vaccines.    -All adults should be screened once for Hepatitis C.  -All adults age 38-68 who are overweight should have a diabetes screening test once every three years.   -Other screening tests & preventive services for persons with diabetes include: an eye exam to screen for diabetic retinopathy, a kidney function test, a foot exam, and stricter control over your cholesterol.   -Cardiovascular screening for adults with routine risk involves an electrocardiogram (ECG) at intervals determined by the provider.     -Colorectal cancer screening should be done for adults age 43-69 with no increased risk factors for colorectal cancer. There are a number of acceptable methods of screening for this type of cancer. Each test has its own benefits and drawbacks. Discuss with your provider what is most appropriate for you during your annual wellness visit. The different tests include: colonoscopy (considered the best screening method), a fecal occult blood test, a fecal DNA test, and sigmoidoscopy.    -Lung cancer screening is recommended annually with a low dose CT scan for adults between age 54 and 68, who have smoked at least 30 pack years (equivalent of 1 pack per day for 30 days), and who is a current smoker or quit less than 15 years ago. -An Abdominal Aortic Aneurysm (AAA) Screening is recommended for men age 73-68 who has ever smoked in their lifetime.      Here is a list of your current Health Maintenance items (your personalized list of preventive services) with a due date:  Health Maintenance Due   Topic Date Due    Shingles Vaccine (1 of 2) Never done    DTaP/Tdap/Td  (1 - Tdap) 07/06/2015    Colorectal Screening  10/13/2016    COVID-19 Vaccine (4 - Booster for "[employer] series) 12/01/2021    Yearly Flu Vaccine (1) 08/01/2022

## 2023-01-17 NOTE — PROGRESS NOTES
This is the Subsequent Medicare Annual Wellness Exam, performed 12 months or more after the Initial AWV or the last Subsequent AWV    I have reviewed the patient's medical history in detail and updated the computerized patient record. /69 (BP 1 Location: Left upper arm, BP Patient Position: Sitting, BP Cuff Size: Large adult)   Pulse 69   Temp 97 °F (36.1 °C) (Temporal)   Resp 16   Ht 5' 9\" (1.753 m)   Wt 234 lb (106.1 kg)   SpO2 98%   BMI 34.56 kg/m²       Assessment/Plan   Education and counseling provided:  Are appropriate based on today's review and evaluation    1. Medicare annual wellness visit, subsequent     Depression Risk Factor Screening     3 most recent PHQ Screens 1/17/2023   PHQ Not Done -   Little interest or pleasure in doing things Not at all   Feeling down, depressed, irritable, or hopeless Not at all   Total Score PHQ 2 0       Alcohol & Drug Abuse Risk Screen    Do you average more than 1 drink per night or more than 7 drinks a week: No    In the past three months have you have had more than 4 drinks containing alcohol on one occasion: No          Functional Ability and Level of Safety    Hearing: The patient wears hearing aids. Activities of Daily Living: The home contains: grab bars  Patient does total self care      Ambulation: with mild difficulty     Fall Risk:  Fall Risk Assessment, last 12 mths 1/17/2023   Able to walk? Yes   Fall in past 12 months? 0   Do you feel unsteady? 0   Are you worried about falling 0   Is TUG test greater than 12 seconds? -   Is the gait abnormal? -   Number of falls in past 12 months -   Fall with injury?  -      Abuse Screen:  Patient is not abused       Cognitive Screening    Has your family/caregiver stated any concerns about your memory: no     Cognitive Screening: Normal - Animal Naming Test 12 in 30s    Health Maintenance Due     Health Maintenance Due   Topic Date Due    Shingles Vaccine (1 of 2) Never done    DTaP/Tdap/Td series (1 - Tdap) 07/06/2015    Colorectal Cancer Screening Combo  10/13/2016    COVID-19 Vaccine (4 - Booster for Pfizer series) 12/01/2021    Flu Vaccine (1) 08/01/2022       Patient Care Team   Patient Care Team:  Cadence Watson MD as PCP - General (Internal Medicine Physician)  Cadence Watson MD as PCP - [facility] Empaneled Provider  Jarrod Prieto MD as Consulting Provider (Gastroenterology)  Jojo Clifford MD as Consulting Provider (Surgery Physician)  kC Diggs MD as Consulting Provider (Thoracic Disease Surgery)  Osmany Cunningham MD as Consulting Provider (Gastroenterology)    History     Patient Active Problem List   Diagnosis Code    Prostate enlargement N40.0    Hypercholesteremia E78.00    Depression F32. A    Gross hematuria R31.0    Hypertrophy of prostate with urinary obstruction and other lower urinary tract symptoms (LUTS) N40.1, N13.8    Dysuria R30.0    Essential hypertension I10    Coronary artery disease involving native heart without angina pectoris I25.10    Advance directive discussed with patient Z71.89    Severe obesity (BMI 35.0-39. 9) E66.01    Essential tremor G25.0     Past Medical History:   Diagnosis Date    Anxiety     ASCVD (arteriosclerotic cardiovascular disease)     Benign essential tremor     BPH (benign prostatic hyperplasia)     Chronic superficial gastritis without bleeding 09/06/2022    by EGD    Depression     Diverticulosis 09/06/2022    Fall 01/29/2021    Gross hematuria     History of colon polyps 09/06/2022    transverse, rectosigmoid.   Adenomatous and hyperplastic    Hypercholesterolemia     Hypertension     MI (myocardial infarction) ([facility] [address] 2005    Sleep apnea     Wrist fracture, right 01/29/2021    buckle fracture, right distal radius, non-displaced      Past Surgical History:   Procedure Laterality Date    HX COLONOSCOPY  10/17/2011    5 yr f/u, Dr. Gerard House  09/06/2022    polyps, Dr. Lulu Zendejas, 5 yr f/u    HX CORONARY ARTERY BYPASS GRAFT  05/24/2016    LIMA to LAD, SVG OM-LPLV, 3 v total    HX CORONARY STENT PLACEMENT  01/01/2005    X2 , Dr. Lesli Christian    HX ENDOSCOPY  09/06/2022    EGD, Dr. Shaylee Dooley  01/01/2005    HX UROLOGICAL  03/28/2013    Cystoscopy, Retrograde Pyelogram, Bladder Biopsy     HX VASECTOMY       Current Outpatient Medications   Medication Sig Dispense Refill    escitalopram oxalate (LEXAPRO) 20 mg tablet Take 1 tablet by mouth once daily 90 Tablet 3    zolpidem (AMBIEN) 5 mg tablet Take 1 Tablet by mouth nightly as needed for Sleep. Max Daily Amount: 5 mg. 30 Tablet 5    ezetimibe (ZETIA) 10 mg tablet Take 1 Tablet by mouth daily. 90 Tablet 1    metoprolol tartrate (LOPRESSOR) 50 mg tablet Take 1 Tablet by mouth two (2) times a day. 180 Tablet 1    rosuvastatin (CRESTOR) 10 mg tablet Take 1 tablet by mouth once daily 90 Tablet 3    Nitrostat 0.4 mg SL tablet 1 Tab by SubLINGual route every five (5) minutes as needed for Chest Pain. Indications: acute attack of angina 1 Bottle 5    acetaminophen (TYLENOL) 325 mg tablet Take 650 mg by mouth every six (6) hours as needed. Indications: pain      aspirin delayed-release 81 mg tablet Take 81 mg by mouth daily. omeprazole (PRILOSEC) 40 mg capsule Take 1 capsule by mouth in the morning (Patient not taking: Reported on 1/17/2023) 30 Capsule 5    peg 3350-Electrolytes-Vit C (MOVIPREP) 100-7.5-2.691 gram pwpk oral solution as directed. (Patient not taking: No sig reported)      tamsulosin (FLOMAX) 0.4 mg capsule Take 1 Capsule by mouth daily (after dinner).  (Patient not taking: Reported on 1/17/2023) 90 Capsule 3     No Known Allergies    Family History   Problem Relation Age of Onset    Cancer Mother         Oral, under tongue    Cancer Maternal Grandfather         Renal Cell    Colon Cancer Paternal Aunt     Pancreatic Cancer Sister     Coronary Art Dis Paternal Grandfather      Social History     Tobacco Use    Smoking status: Former Packs/day: 1.00     Years: 28.00     Pack years: 28.00     Types: Cigarettes     Quit date: 1989     Years since quittin.0    Smokeless tobacco: Never   Substance Use Topics    Alcohol use:  Yes     Alcohol/week: 11.7 standard drinks     Types: 14 Glasses of wine per week         Adrián Landers MD

## 2023-03-23 RX ORDER — EZETIMIBE 10 MG/1
TABLET ORAL
Qty: 90 TABLET | Refills: 3 | Status: SHIPPED | OUTPATIENT
Start: 2023-03-23

## 2023-03-23 RX ORDER — ROSUVASTATIN CALCIUM 10 MG/1
TABLET, COATED ORAL
Qty: 90 TABLET | Refills: 3 | Status: SHIPPED | OUTPATIENT
Start: 2023-03-23

## 2023-03-24 ENCOUNTER — TELEPHONE (OUTPATIENT)
Facility: CLINIC | Age: 78
End: 2023-03-24

## 2023-03-24 NOTE — TELEPHONE ENCOUNTER
Pharmacy needs clarification on Rosuvastatin 10 mg tab per pharmacy need to change to 5 mg they cannot get 10 mg tablets

## 2023-03-26 RX ORDER — ROSUVASTATIN CALCIUM 5 MG/1
10 TABLET, COATED ORAL DAILY
Qty: 180 TABLET | Refills: 1 | Status: SHIPPED | OUTPATIENT
Start: 2023-03-26

## 2023-04-07 ENCOUNTER — TELEPHONE (OUTPATIENT)
Facility: CLINIC | Age: 78
End: 2023-04-07

## 2023-06-27 ENCOUNTER — TELEPHONE (OUTPATIENT)
Facility: CLINIC | Age: 78
End: 2023-06-27

## 2023-06-30 RX ORDER — METOPROLOL TARTRATE 50 MG/1
TABLET, FILM COATED ORAL
Qty: 180 TABLET | Refills: 3 | Status: SHIPPED | OUTPATIENT
Start: 2023-06-30

## 2023-07-17 ENCOUNTER — HOSPITAL ENCOUNTER (OUTPATIENT)
Facility: HOSPITAL | Age: 78
Setting detail: SPECIMEN
Discharge: HOME | End: 2023-07-20
Payer: MEDICARE

## 2023-07-17 ENCOUNTER — OFFICE VISIT (OUTPATIENT)
Facility: CLINIC | Age: 78
End: 2023-07-17
Payer: MEDICARE

## 2023-07-17 VITALS
DIASTOLIC BLOOD PRESSURE: 68 MMHG | SYSTOLIC BLOOD PRESSURE: 117 MMHG | RESPIRATION RATE: 16 BRPM | HEIGHT: 69 IN | TEMPERATURE: 97.9 F | WEIGHT: 239 LBS | HEART RATE: 62 BPM | OXYGEN SATURATION: 95 % | BODY MASS INDEX: 35.4 KG/M2

## 2023-07-17 DIAGNOSIS — E66.01 SEVERE OBESITY (BMI 35.0-39.9) WITH COMORBIDITY (HCC): ICD-10-CM

## 2023-07-17 DIAGNOSIS — R73.9 HYPERGLYCEMIA, UNSPECIFIED: ICD-10-CM

## 2023-07-17 DIAGNOSIS — F51.01 PRIMARY INSOMNIA: ICD-10-CM

## 2023-07-17 DIAGNOSIS — I10 ESSENTIAL (PRIMARY) HYPERTENSION: ICD-10-CM

## 2023-07-17 DIAGNOSIS — E78.00 PURE HYPERCHOLESTEROLEMIA, UNSPECIFIED: ICD-10-CM

## 2023-07-17 DIAGNOSIS — I10 ESSENTIAL (PRIMARY) HYPERTENSION: Primary | ICD-10-CM

## 2023-07-17 DIAGNOSIS — Z76.0 MEDICATION REFILL: ICD-10-CM

## 2023-07-17 LAB
ALBUMIN SERPL-MCNC: 3.4 G/DL (ref 3.4–5)
ALBUMIN/GLOB SERPL: 1.2 (ref 0.8–1.7)
ALP SERPL-CCNC: 74 U/L (ref 45–117)
ALT SERPL-CCNC: 21 U/L (ref 16–61)
ANION GAP SERPL CALC-SCNC: 2 MMOL/L (ref 3–18)
AST SERPL-CCNC: 24 U/L (ref 10–38)
BILIRUB SERPL-MCNC: 0.9 MG/DL (ref 0.2–1)
BUN SERPL-MCNC: 16 MG/DL (ref 7–18)
BUN/CREAT SERPL: 17 (ref 12–20)
CALCIUM SERPL-MCNC: 8.8 MG/DL (ref 8.5–10.1)
CHLORIDE SERPL-SCNC: 108 MMOL/L (ref 100–111)
CHOLEST SERPL-MCNC: 115 MG/DL
CO2 SERPL-SCNC: 29 MMOL/L (ref 21–32)
CREAT SERPL-MCNC: 0.95 MG/DL (ref 0.6–1.3)
EST. AVERAGE GLUCOSE BLD GHB EST-MCNC: 114 MG/DL
GLOBULIN SER CALC-MCNC: 2.9 G/DL (ref 2–4)
GLUCOSE SERPL-MCNC: 106 MG/DL (ref 74–99)
HBA1C MFR BLD: 5.6 % (ref 4.2–5.6)
HDLC SERPL-MCNC: 46 MG/DL (ref 40–60)
HDLC SERPL: 2.5 (ref 0–5)
LDLC SERPL CALC-MCNC: 42.4 MG/DL (ref 0–100)
LIPID PANEL: NORMAL
POTASSIUM SERPL-SCNC: 4.6 MMOL/L (ref 3.5–5.5)
PROT SERPL-MCNC: 6.3 G/DL (ref 6.4–8.2)
SODIUM SERPL-SCNC: 139 MMOL/L (ref 136–145)
TRIGL SERPL-MCNC: 133 MG/DL
VLDLC SERPL CALC-MCNC: 26.6 MG/DL

## 2023-07-17 PROCEDURE — 3074F SYST BP LT 130 MM HG: CPT | Performed by: INTERNAL MEDICINE

## 2023-07-17 PROCEDURE — G8417 CALC BMI ABV UP PARAM F/U: HCPCS | Performed by: INTERNAL MEDICINE

## 2023-07-17 PROCEDURE — 99214 OFFICE O/P EST MOD 30 MIN: CPT | Performed by: INTERNAL MEDICINE

## 2023-07-17 PROCEDURE — 80061 LIPID PANEL: CPT

## 2023-07-17 PROCEDURE — 1036F TOBACCO NON-USER: CPT | Performed by: INTERNAL MEDICINE

## 2023-07-17 PROCEDURE — 80053 COMPREHEN METABOLIC PANEL: CPT

## 2023-07-17 PROCEDURE — 1123F ACP DISCUSS/DSCN MKR DOCD: CPT | Performed by: INTERNAL MEDICINE

## 2023-07-17 PROCEDURE — 36415 COLL VENOUS BLD VENIPUNCTURE: CPT

## 2023-07-17 PROCEDURE — G8427 DOCREV CUR MEDS BY ELIG CLIN: HCPCS | Performed by: INTERNAL MEDICINE

## 2023-07-17 PROCEDURE — 83036 HEMOGLOBIN GLYCOSYLATED A1C: CPT

## 2023-07-17 PROCEDURE — 3078F DIAST BP <80 MM HG: CPT | Performed by: INTERNAL MEDICINE

## 2023-07-17 RX ORDER — ZOLPIDEM TARTRATE 10 MG/1
10 TABLET ORAL NIGHTLY PRN
Qty: 90 TABLET | Refills: 1 | Status: SHIPPED | OUTPATIENT
Start: 2023-07-17 | End: 2024-01-13

## 2023-07-17 ASSESSMENT — PATIENT HEALTH QUESTIONNAIRE - PHQ9
SUM OF ALL RESPONSES TO PHQ QUESTIONS 1-9: 0
3. TROUBLE FALLING OR STAYING ASLEEP: 0
8. MOVING OR SPEAKING SO SLOWLY THAT OTHER PEOPLE COULD HAVE NOTICED. OR THE OPPOSITE, BEING SO FIGETY OR RESTLESS THAT YOU HAVE BEEN MOVING AROUND A LOT MORE THAN USUAL: 0
SUM OF ALL RESPONSES TO PHQ QUESTIONS 1-9: 0
SUM OF ALL RESPONSES TO PHQ QUESTIONS 1-9: 0
1. LITTLE INTEREST OR PLEASURE IN DOING THINGS: 0
SUM OF ALL RESPONSES TO PHQ QUESTIONS 1-9: 0
2. FEELING DOWN, DEPRESSED OR HOPELESS: 0
SUM OF ALL RESPONSES TO PHQ9 QUESTIONS 1 & 2: 0
6. FEELING BAD ABOUT YOURSELF - OR THAT YOU ARE A FAILURE OR HAVE LET YOURSELF OR YOUR FAMILY DOWN: 0
5. POOR APPETITE OR OVEREATING: 0
10. IF YOU CHECKED OFF ANY PROBLEMS, HOW DIFFICULT HAVE THESE PROBLEMS MADE IT FOR YOU TO DO YOUR WORK, TAKE CARE OF THINGS AT HOME, OR GET ALONG WITH OTHER PEOPLE: 0
7. TROUBLE CONCENTRATING ON THINGS, SUCH AS READING THE NEWSPAPER OR WATCHING TELEVISION: 0
9. THOUGHTS THAT YOU WOULD BE BETTER OFF DEAD, OR OF HURTING YOURSELF: 0
4. FEELING TIRED OR HAVING LITTLE ENERGY: 0

## 2023-07-17 ASSESSMENT — ENCOUNTER SYMPTOMS: SHORTNESS OF BREATH: 0

## 2023-11-28 ENCOUNTER — OFFICE VISIT (OUTPATIENT)
Facility: CLINIC | Age: 78
End: 2023-11-28
Payer: MEDICARE

## 2023-11-28 VITALS
OXYGEN SATURATION: 97 % | WEIGHT: 246 LBS | HEART RATE: 58 BPM | HEIGHT: 69 IN | SYSTOLIC BLOOD PRESSURE: 109 MMHG | DIASTOLIC BLOOD PRESSURE: 53 MMHG | RESPIRATION RATE: 18 BRPM | BODY MASS INDEX: 36.43 KG/M2 | TEMPERATURE: 96.8 F

## 2023-11-28 DIAGNOSIS — R53.83 LOW ENERGY: ICD-10-CM

## 2023-11-28 DIAGNOSIS — R68.82 LOW LIBIDO: ICD-10-CM

## 2023-11-28 DIAGNOSIS — E16.2 HYPOGLYCEMIA: ICD-10-CM

## 2023-11-28 DIAGNOSIS — E16.2 LOW BLOOD GLUCOSE MEASUREMENT: Primary | ICD-10-CM

## 2023-11-28 PROCEDURE — 3074F SYST BP LT 130 MM HG: CPT | Performed by: INTERNAL MEDICINE

## 2023-11-28 PROCEDURE — G8417 CALC BMI ABV UP PARAM F/U: HCPCS | Performed by: INTERNAL MEDICINE

## 2023-11-28 PROCEDURE — G8428 CUR MEDS NOT DOCUMENT: HCPCS | Performed by: INTERNAL MEDICINE

## 2023-11-28 PROCEDURE — 1123F ACP DISCUSS/DSCN MKR DOCD: CPT | Performed by: INTERNAL MEDICINE

## 2023-11-28 PROCEDURE — 3078F DIAST BP <80 MM HG: CPT | Performed by: INTERNAL MEDICINE

## 2023-11-28 PROCEDURE — G8484 FLU IMMUNIZE NO ADMIN: HCPCS | Performed by: INTERNAL MEDICINE

## 2023-11-28 PROCEDURE — 99213 OFFICE O/P EST LOW 20 MIN: CPT | Performed by: INTERNAL MEDICINE

## 2023-11-28 PROCEDURE — 1036F TOBACCO NON-USER: CPT | Performed by: INTERNAL MEDICINE

## 2023-11-28 ASSESSMENT — PATIENT HEALTH QUESTIONNAIRE - PHQ9
10. IF YOU CHECKED OFF ANY PROBLEMS, HOW DIFFICULT HAVE THESE PROBLEMS MADE IT FOR YOU TO DO YOUR WORK, TAKE CARE OF THINGS AT HOME, OR GET ALONG WITH OTHER PEOPLE: 0
3. TROUBLE FALLING OR STAYING ASLEEP: 0
SUM OF ALL RESPONSES TO PHQ QUESTIONS 1-9: 0
7. TROUBLE CONCENTRATING ON THINGS, SUCH AS READING THE NEWSPAPER OR WATCHING TELEVISION: 0
8. MOVING OR SPEAKING SO SLOWLY THAT OTHER PEOPLE COULD HAVE NOTICED. OR THE OPPOSITE, BEING SO FIGETY OR RESTLESS THAT YOU HAVE BEEN MOVING AROUND A LOT MORE THAN USUAL: 0
9. THOUGHTS THAT YOU WOULD BE BETTER OFF DEAD, OR OF HURTING YOURSELF: 0
6. FEELING BAD ABOUT YOURSELF - OR THAT YOU ARE A FAILURE OR HAVE LET YOURSELF OR YOUR FAMILY DOWN: 0
SUM OF ALL RESPONSES TO PHQ QUESTIONS 1-9: 0
5. POOR APPETITE OR OVEREATING: 0
2. FEELING DOWN, DEPRESSED OR HOPELESS: 0
1. LITTLE INTEREST OR PLEASURE IN DOING THINGS: 0
SUM OF ALL RESPONSES TO PHQ9 QUESTIONS 1 & 2: 0
SUM OF ALL RESPONSES TO PHQ QUESTIONS 1-9: 0
4. FEELING TIRED OR HAVING LITTLE ENERGY: 0
SUM OF ALL RESPONSES TO PHQ QUESTIONS 1-9: 0

## 2023-11-28 ASSESSMENT — ENCOUNTER SYMPTOMS: GASTROINTESTINAL NEGATIVE: 1

## 2023-11-30 ENCOUNTER — HOSPITAL ENCOUNTER (OUTPATIENT)
Facility: HOSPITAL | Age: 78
Setting detail: SPECIMEN
Discharge: HOME | End: 2023-11-30
Payer: MEDICARE

## 2023-11-30 DIAGNOSIS — E16.2 LOW BLOOD GLUCOSE MEASUREMENT: ICD-10-CM

## 2023-11-30 DIAGNOSIS — R53.83 LOW ENERGY: ICD-10-CM

## 2023-11-30 DIAGNOSIS — R68.82 LOW LIBIDO: ICD-10-CM

## 2023-11-30 PROCEDURE — 84403 ASSAY OF TOTAL TESTOSTERONE: CPT

## 2023-11-30 PROCEDURE — 36415 COLL VENOUS BLD VENIPUNCTURE: CPT

## 2023-11-30 PROCEDURE — 84402 ASSAY OF FREE TESTOSTERONE: CPT

## 2023-11-30 NOTE — PROGRESS NOTES
1. \"Have you been to the ER, urgent care clinic since your last visit? Hospitalized since your last visit? \" No    2. \"Have you seen or consulted any other health care providers outside of the [address] [place] [address] since your last visit? \" No     3. For patients aged 43-73: Has the patient had a colonoscopy / FIT/ Cologuard? NA - based on age      If the patient is female:    4. For patients aged 43-66: Has the patient had a mammogram within the past 2 years? NA - based on age or sex      11. For patients aged 21-65: Has the patient had a pap smear?  NA - based on age or sex
Past Medical History:   Diagnosis Date    Anxiety     ASCVD (arteriosclerotic cardiovascular disease)     Benign essential tremor     BPH (benign prostatic hyperplasia)     Chronic superficial gastritis without bleeding 09/06/2022    by EGD    COVID-19 01/25/2023    at home test    Depression     Diverticulosis 09/06/2022    Fall 01/29/2021    Gross hematuria     History of colon polyps 09/06/2022    transverse, rectosigmoid. Adenomatous and hyperplastic    Hypercholesterolemia     Hypertension     MI (myocardial infarction) ([address]) 2005    Sleep apnea     Wrist fracture, right 01/29/2021    buckle fracture, right distal radius, non-displaced     Past Surgical History:   Procedure Laterality Date    CARDIAC CATHETERIZATION  01/01/2005    COLONOSCOPY  09/06/2022    polyps, Dr. Seymour Amado, 5 yr f/u    CORONARY ANGIOPLASTY WITH STENT PLACEMENT  01/01/2005    X2 , Dr. Amezcua Render  05/24/2016    LIMA to LAD, SVG OM-LPLV, 3 v total    UPPER GASTROINTESTINAL ENDOSCOPY  09/06/2022    EGD, Dr. Kyra Felix  03/28/2013    Cystoscopy, Retrograde Pyelogram, Bladder Biopsy     UROLOGICAL SURGERY  04/26/2023    TRANSRECTAL ULTRASOUND OF PROSTATE; GREENLIGHT LASER PHOTOSELECTIVE VAPORIZATION OF THE PROSTATE; Dr Susanne Do       Current Outpatient Medications   Medication Sig    zolpidem (AMBIEN) 10 MG tablet Take 1 tablet by mouth nightly as needed for Sleep for up to 180 days.  Max Daily Amount: 10 mg    metoprolol tartrate (LOPRESSOR) 50 MG tablet Take 1 tablet by mouth twice daily    rosuvastatin (CRESTOR) 10 MG tablet Take 1 tablet by mouth once daily    ezetimibe (ZETIA) 10 MG tablet Take 1 tablet by mouth once daily    aspirin 81 MG EC tablet Take 1 tablet by mouth daily    escitalopram (LEXAPRO) 20 MG tablet Take 1 tablet by mouth once daily    nitroGLYCERIN (NITROSTAT) 0.4 MG SL tablet Place 1 tablet under the tongue     No current facility-administered
fatigue. Discussed indications for treatment but limitations due to his prostate. But he is considering surgery to remove that as he is having trouble voiding and a recent urological procedure did not seem to help. Other lab including thyroid and CBC had not shown any cause for fatigue. His cardiac status appears to be stable. ? His Lopressor?     F/u as appointed in January

## 2023-12-03 LAB
TESTOST FREE SERPL-MCNC: NORMAL PG/ML
TESTOST SERPL-MCNC: 553 NG/DL (ref 264–916)

## 2023-12-05 LAB
TESTOST FREE SERPL-MCNC: 4.8 PG/ML (ref 6.6–18.1)
TESTOST SERPL-MCNC: 553 NG/DL (ref 264–916)

## 2023-12-20 DIAGNOSIS — G47.9 SLEEP DISORDER: Primary | ICD-10-CM

## 2023-12-20 NOTE — PROGRESS NOTES
Orders Placed This Encounter    [facility] - Sirisha Negro DO, Sleep Medicine, [place] [facility]     Referral Priority:   Routine     Referral Type:   Eval and Treat     Referral Reason:   Specialty Services Required     Requested Specialty:   Sleep Medicine Family Practice     Number of Visits Requested:   1

## 2024-01-01 DIAGNOSIS — Z76.0 MEDICATION REFILL: ICD-10-CM

## 2024-01-01 DIAGNOSIS — F51.01 PRIMARY INSOMNIA: ICD-10-CM

## 2024-01-01 RX ORDER — ZOLPIDEM TARTRATE 10 MG/1
10 TABLET ORAL NIGHTLY PRN
Qty: 90 TABLET | Refills: 1 | Status: SHIPPED | OUTPATIENT
Start: 2024-01-01 | End: 2024-06-29

## 2024-01-10 RX ORDER — ESCITALOPRAM OXALATE 20 MG/1
20 TABLET ORAL DAILY
Qty: 90 TABLET | Refills: 1 | Status: SHIPPED | OUTPATIENT
Start: 2024-01-10

## 2024-01-10 NOTE — TELEPHONE ENCOUNTER
Patient is requesting to have refill on     escitalopram (LEXAPRO) 20 MG tablet     Future Appointments   Date Time Provider Department Center   1/17/2024  1:30 PM Jessie Zimmer MD [facility] AMB   1/31/2024 10:00 AM Boogie Smith PA-C [facility] Dia Sched

## 2024-01-31 PROBLEM — F33.1 MAJOR DEPRESSIVE DISORDER, RECURRENT, MODERATE (HCC): Status: ACTIVE | Noted: 2024-01-31

## 2024-01-31 PROBLEM — F33.0 MAJOR DEPRESSIVE DISORDER, RECURRENT, MILD (HCC): Status: ACTIVE | Noted: 2024-01-31

## 2024-01-31 PROBLEM — F33.9 MAJOR DEPRESSIVE DISORDER, RECURRENT, UNSPECIFIED (HCC): Status: ACTIVE | Noted: 2024-01-31

## 2024-02-13 ENCOUNTER — CLINICAL DOCUMENTATION (OUTPATIENT)
Age: 79
End: 2024-02-13

## 2024-03-25 SDOH — HEALTH STABILITY: PHYSICAL HEALTH: ON AVERAGE, HOW MANY DAYS PER WEEK DO YOU ENGAGE IN MODERATE TO STRENUOUS EXERCISE (LIKE A BRISK WALK)?: 0 DAYS

## 2024-03-25 ASSESSMENT — LIFESTYLE VARIABLES
HOW OFTEN DO YOU HAVE A DRINK CONTAINING ALCOHOL: NEVER
HOW OFTEN DO YOU HAVE A DRINK CONTAINING ALCOHOL: 1
HOW MANY STANDARD DRINKS CONTAINING ALCOHOL DO YOU HAVE ON A TYPICAL DAY: 0
HOW MANY STANDARD DRINKS CONTAINING ALCOHOL DO YOU HAVE ON A TYPICAL DAY: PATIENT DOES NOT DRINK

## 2024-03-25 ASSESSMENT — PATIENT HEALTH QUESTIONNAIRE - PHQ9
1. LITTLE INTEREST OR PLEASURE IN DOING THINGS: NOT AT ALL
SUM OF ALL RESPONSES TO PHQ QUESTIONS 1-9: 1
2. FEELING DOWN, DEPRESSED OR HOPELESS: NOT AT ALL
9. THOUGHTS THAT YOU WOULD BE BETTER OFF DEAD, OR OF HURTING YOURSELF: NOT AT ALL
SUM OF ALL RESPONSES TO PHQ QUESTIONS 1-9: 1
4. FEELING TIRED OR HAVING LITTLE ENERGY: NOT AT ALL
SUM OF ALL RESPONSES TO PHQ9 QUESTIONS 1 & 2: 0
7. TROUBLE CONCENTRATING ON THINGS, SUCH AS READING THE NEWSPAPER OR WATCHING TELEVISION: NOT AT ALL
6. FEELING BAD ABOUT YOURSELF - OR THAT YOU ARE A FAILURE OR HAVE LET YOURSELF OR YOUR FAMILY DOWN: NOT AT ALL
8. MOVING OR SPEAKING SO SLOWLY THAT OTHER PEOPLE COULD HAVE NOTICED. OR THE OPPOSITE, BEING SO FIGETY OR RESTLESS THAT YOU HAVE BEEN MOVING AROUND A LOT MORE THAN USUAL: NOT AT ALL
SUM OF ALL RESPONSES TO PHQ QUESTIONS 1-9: 1
3. TROUBLE FALLING OR STAYING ASLEEP: SEVERAL DAYS
SUM OF ALL RESPONSES TO PHQ QUESTIONS 1-9: 1
5. POOR APPETITE OR OVEREATING: NOT AT ALL
10. IF YOU CHECKED OFF ANY PROBLEMS, HOW DIFFICULT HAVE THESE PROBLEMS MADE IT FOR YOU TO DO YOUR WORK, TAKE CARE OF THINGS AT HOME, OR GET ALONG WITH OTHER PEOPLE: NOT DIFFICULT AT ALL

## 2024-03-28 ENCOUNTER — HOSPITAL ENCOUNTER (OUTPATIENT)
Facility: HOSPITAL | Age: 79
Setting detail: SPECIMEN
Discharge: HOME | End: 2024-03-28
Payer: MEDICARE

## 2024-03-28 ENCOUNTER — OFFICE VISIT (OUTPATIENT)
Facility: CLINIC | Age: 79
End: 2024-03-28

## 2024-03-28 VITALS
OXYGEN SATURATION: 94 % | RESPIRATION RATE: 17 BRPM | HEART RATE: 61 BPM | WEIGHT: 228 LBS | HEIGHT: 69 IN | BODY MASS INDEX: 33.77 KG/M2 | DIASTOLIC BLOOD PRESSURE: 60 MMHG | SYSTOLIC BLOOD PRESSURE: 121 MMHG | TEMPERATURE: 97.3 F

## 2024-03-28 DIAGNOSIS — N40.1 BENIGN PROSTATIC HYPERPLASIA WITH LOWER URINARY TRACT SYMPTOMS, SYMPTOM DETAILS UNSPECIFIED: ICD-10-CM

## 2024-03-28 DIAGNOSIS — Z00.00 MEDICARE ANNUAL WELLNESS VISIT, SUBSEQUENT: Primary | ICD-10-CM

## 2024-03-28 DIAGNOSIS — Z76.0 MEDICATION REFILL: ICD-10-CM

## 2024-03-28 DIAGNOSIS — R73.9 HYPERGLYCEMIA, UNSPECIFIED: ICD-10-CM

## 2024-03-28 DIAGNOSIS — E78.00 PURE HYPERCHOLESTEROLEMIA, UNSPECIFIED: ICD-10-CM

## 2024-03-28 DIAGNOSIS — I10 ESSENTIAL (PRIMARY) HYPERTENSION: ICD-10-CM

## 2024-03-28 LAB
ALBUMIN SERPL-MCNC: 3.6 G/DL (ref 3.4–5)
ALBUMIN/GLOB SERPL: 1.3 (ref 0.8–1.7)
ALP SERPL-CCNC: 82 U/L (ref 45–117)
ALT SERPL-CCNC: 20 U/L (ref 16–61)
ANION GAP SERPL CALC-SCNC: 2 MMOL/L (ref 3–18)
AST SERPL-CCNC: 15 U/L (ref 10–38)
BILIRUB SERPL-MCNC: 0.8 MG/DL (ref 0.2–1)
BUN SERPL-MCNC: 19 MG/DL (ref 7–18)
BUN/CREAT SERPL: 22 (ref 12–20)
CALCIUM SERPL-MCNC: 8.9 MG/DL (ref 8.5–10.1)
CHLORIDE SERPL-SCNC: 110 MMOL/L (ref 100–111)
CHOLEST SERPL-MCNC: 104 MG/DL
CO2 SERPL-SCNC: 29 MMOL/L (ref 21–32)
CREAT SERPL-MCNC: 0.88 MG/DL (ref 0.6–1.3)
EST. AVERAGE GLUCOSE BLD GHB EST-MCNC: 103 MG/DL
GLOBULIN SER CALC-MCNC: 2.7 G/DL (ref 2–4)
GLUCOSE SERPL-MCNC: 88 MG/DL (ref 74–99)
HBA1C MFR BLD: 5.2 % (ref 4.2–5.6)
HDLC SERPL-MCNC: 45 MG/DL (ref 40–60)
HDLC SERPL: 2.3 (ref 0–5)
LDLC SERPL CALC-MCNC: 43.6 MG/DL (ref 0–100)
LIPID PANEL: NORMAL
POTASSIUM SERPL-SCNC: 4.7 MMOL/L (ref 3.5–5.5)
PROT SERPL-MCNC: 6.3 G/DL (ref 6.4–8.2)
SODIUM SERPL-SCNC: 141 MMOL/L (ref 136–145)
TRIGL SERPL-MCNC: 77 MG/DL
VLDLC SERPL CALC-MCNC: 15.4 MG/DL

## 2024-03-28 PROCEDURE — 80053 COMPREHEN METABOLIC PANEL: CPT

## 2024-03-28 PROCEDURE — 80061 LIPID PANEL: CPT

## 2024-03-28 PROCEDURE — 83036 HEMOGLOBIN GLYCOSYLATED A1C: CPT

## 2024-03-28 PROCEDURE — 36415 COLL VENOUS BLD VENIPUNCTURE: CPT

## 2024-03-28 RX ORDER — TAMSULOSIN HYDROCHLORIDE 0.4 MG/1
0.4 CAPSULE ORAL DAILY
Qty: 90 CAPSULE | Refills: 3 | Status: SHIPPED | OUTPATIENT
Start: 2024-03-28

## 2024-03-28 SDOH — SOCIAL STABILITY: SOCIAL INSECURITY: WITHIN THE LAST YEAR, HAVE YOU BEEN HUMILIATED OR EMOTIONALLY ABUSED IN OTHER WAYS BY YOUR PARTNER OR EX-PARTNER?: NO

## 2024-03-28 SDOH — SOCIAL STABILITY: SOCIAL NETWORK: HOW OFTEN DO YOU ATTEND CHURCH OR RELIGIOUS SERVICES?: 1 TO 4 TIMES PER YEAR

## 2024-03-28 SDOH — ECONOMIC STABILITY: HOUSING INSECURITY: IN THE LAST 12 MONTHS, HOW MANY PLACES HAVE YOU LIVED?: 1

## 2024-03-28 SDOH — ECONOMIC STABILITY: FOOD INSECURITY: WITHIN THE PAST 12 MONTHS, YOU WORRIED THAT YOUR FOOD WOULD RUN OUT BEFORE YOU GOT MONEY TO BUY MORE.: NEVER TRUE

## 2024-03-28 SDOH — SOCIAL STABILITY: SOCIAL INSECURITY
WITHIN THE LAST YEAR, HAVE TO BEEN RAPED OR FORCED TO HAVE ANY KIND OF SEXUAL ACTIVITY BY YOUR PARTNER OR EX-PARTNER?: NO

## 2024-03-28 SDOH — SOCIAL STABILITY: SOCIAL NETWORK
IN A TYPICAL WEEK, HOW MANY TIMES DO YOU TALK ON THE PHONE WITH FAMILY, FRIENDS, OR NEIGHBORS?: MORE THAN THREE TIMES A WEEK

## 2024-03-28 SDOH — SOCIAL STABILITY: SOCIAL NETWORK
DO YOU BELONG TO ANY CLUBS OR ORGANIZATIONS SUCH AS CHURCH GROUPS UNIONS, FRATERNAL OR ATHLETIC GROUPS, OR SCHOOL GROUPS?: NO

## 2024-03-28 SDOH — ECONOMIC STABILITY: INCOME INSECURITY: IN THE LAST 12 MONTHS, WAS THERE A TIME WHEN YOU WERE NOT ABLE TO PAY THE MORTGAGE OR RENT ON TIME?: NO

## 2024-03-28 SDOH — SOCIAL STABILITY: SOCIAL NETWORK: HOW OFTEN DO YOU GET TOGETHER WITH FRIENDS OR RELATIVES?: MORE THAN THREE TIMES A WEEK

## 2024-03-28 SDOH — SOCIAL STABILITY: SOCIAL NETWORK: ARE YOU MARRIED, WIDOWED, DIVORCED, SEPARATED, NEVER MARRIED, OR LIVING WITH A PARTNER?: MARRIED

## 2024-03-28 SDOH — SOCIAL STABILITY: SOCIAL INSECURITY: WITHIN THE LAST YEAR, HAVE YOU BEEN AFRAID OF YOUR PARTNER OR EX-PARTNER?: NO

## 2024-03-28 SDOH — HEALTH STABILITY: PHYSICAL HEALTH: ON AVERAGE, HOW MANY MINUTES DO YOU ENGAGE IN EXERCISE AT THIS LEVEL?: 0 MIN

## 2024-03-28 SDOH — SOCIAL STABILITY: SOCIAL NETWORK: HOW OFTEN DO YOU ATTENT MEETINGS OF THE CLUB OR ORGANIZATION YOU BELONG TO?: NEVER

## 2024-03-28 SDOH — ECONOMIC STABILITY: INCOME INSECURITY: HOW HARD IS IT FOR YOU TO PAY FOR THE VERY BASICS LIKE FOOD, HOUSING, MEDICAL CARE, AND HEATING?: NOT HARD AT ALL

## 2024-03-28 SDOH — ECONOMIC STABILITY: HOUSING INSECURITY
IN THE LAST 12 MONTHS, WAS THERE A TIME WHEN YOU DID NOT HAVE A STEADY PLACE TO SLEEP OR SLEPT IN A SHELTER (INCLUDING NOW)?: NO

## 2024-03-28 SDOH — ECONOMIC STABILITY: FOOD INSECURITY: WITHIN THE PAST 12 MONTHS, THE FOOD YOU BOUGHT JUST DIDN'T LAST AND YOU DIDN'T HAVE MONEY TO GET MORE.: NEVER TRUE

## 2024-03-28 SDOH — HEALTH STABILITY: MENTAL HEALTH
STRESS IS WHEN SOMEONE FEELS TENSE, NERVOUS, ANXIOUS, OR CAN'T SLEEP AT NIGHT BECAUSE THEIR MIND IS TROUBLED. HOW STRESSED ARE YOU?: NOT AT ALL

## 2024-03-28 SDOH — ECONOMIC STABILITY: TRANSPORTATION INSECURITY
IN THE PAST 12 MONTHS, HAS THE LACK OF TRANSPORTATION KEPT YOU FROM MEDICAL APPOINTMENTS OR FROM GETTING MEDICATIONS?: NO

## 2024-03-28 SDOH — SOCIAL STABILITY: SOCIAL INSECURITY
WITHIN THE LAST YEAR, HAVE YOU BEEN KICKED, HIT, SLAPPED, OR OTHERWISE PHYSICALLY HURT BY YOUR PARTNER OR EX-PARTNER?: NO

## 2024-03-28 ASSESSMENT — LIFESTYLE VARIABLES
HOW OFTEN DO YOU HAVE A DRINK CONTAINING ALCOHOL: NEVER
HOW MANY STANDARD DRINKS CONTAINING ALCOHOL DO YOU HAVE ON A TYPICAL DAY: PATIENT DOES NOT DRINK

## 2024-03-28 ASSESSMENT — ENCOUNTER SYMPTOMS: RESPIRATORY NEGATIVE: 1

## 2024-03-28 NOTE — PROGRESS NOTES
Medicare Annual Wellness Visit    Ned Lord is here for Medicare AWV, Hypertension, and Cholesterol Problem    Assessment & Plan   Medicare annual wellness visit, subsequent    Recommendations for Preventive Services Due: see orders and patient instructions/AVS.  Recommended screening schedule for the next 5-10 years is provided to the patient in written form: see Patient Instructions/AVS.     No follow-ups on file.     Subjective       Patient's complete Health Risk Assessment and screening values have been reviewed and are found in Flowsheets. The following problems were reviewed today and where indicated follow up appointments were made and/or referrals ordered.    Positive Risk Factor Screenings with Interventions:                Activity, Diet, and Weight:  On average, how many days per week do you engage in moderate to strenuous exercise (like a brisk walk)?: 0 days  On average, how many minutes do you engage in exercise at this level?: 0 min    Do you eat balanced/healthy meals regularly?: Yes    Body mass index is 33.67 kg/m². (!) Abnormal      Inactivity Interventions:  Pt already has plans for getting more active  Obesity Interventions:  He is already working on diet and has lost significant weight. His goal is to get down to 200            Dentist Screen:  Have you seen the dentist within the past year?: (!) No    Intervention:  Advised to schedule with their dentist     Vision Screen:  Do you have difficulty driving, watching TV, or doing any of your daily activities because of your eyesight?: No  Have you had an eye exam within the past year?: (!) No  No results found.    Interventions:   Patient encouraged to make appointment with their eye specialist                    Objective   Vitals:    03/28/24 1325   BP: 121/60   Site: Right Upper Arm   Position: Sitting   Cuff Size: Medium Adult   Pulse: 61   Resp: 17   Temp: 97.3 °F (36.3 °C)   TempSrc: Temporal   SpO2: 94%   Weight: 103.4 kg (228 lb)

## 2024-03-28 NOTE — PROGRESS NOTES
HISTORY OF PRESENT ILLNESS      Ned Lord is a 78 y.o. male.    /60 (Site: Right Upper Arm, Position: Sitting, Cuff Size: Medium Adult)   Pulse 61   Temp 97.3 °F (36.3 °C) (Temporal)   Resp 17   Ht 1.753 m (5' 9\")   Wt 103.4 kg (228 lb)   SpO2 94%   BMI 33.67 kg/m²     Would like a refill on flomax  He had urological surgery and was off flomax a while. He did not feel the surgery was a helpful as he wished so he resumed the flomax      He reports on ongoing sleep issue since stopping work. His sleep pattern is now like a night owl    Hypertension  This is a chronic problem. The current episode started more than 1 year ago.   Hyperlipidemia  This is a chronic problem. The current episode started more than 1 year ago.       Review of Systems   Constitutional: Negative.    HENT:  Positive for hearing loss.    Respiratory: Negative.     Cardiovascular: Negative.    Neurological: Negative.    Psychiatric/Behavioral: Negative.             Physical Exam  Vitals and nursing note reviewed.   Constitutional:       Appearance: Normal appearance.   HENT:      Head: Normocephalic and atraumatic.   Cardiovascular:      Rate and Rhythm: Normal rate and regular rhythm.   Pulmonary:      Effort: Pulmonary effort is normal.      Breath sounds: Normal breath sounds.   Musculoskeletal:      Right lower leg: No edema.      Left lower leg: No edema.   Skin:     General: Skin is warm and dry.   Neurological:      General: No focal deficit present.      Mental Status: He is alert and oriented to person, place, and time.   Psychiatric:         Mood and Affect: Mood normal.         Behavior: Behavior normal.         ASSESSMENT and PLAN      ICD-10-CM    2. Essential (primary) hypertension  I10 Comprehensive Metabolic Panel      3. Pure hypercholesterolemia, unspecified  E78.00 Lipid Panel      4. Hyperglycemia, unspecified  R73.9 Comprehensive Metabolic Panel     Hemoglobin A1C      5. Benign prostatic hyperplasia with lower

## 2024-03-28 NOTE — PATIENT INSTRUCTIONS
Learning About Being Active as an Older Adult  Why is being active important as you get older?     Being active is one of the best things you can do for your health. And it's never too late to start. Being active--or getting active, if you aren't already--has definite benefits. It can:  Give you more energy,  Keep your mind sharp.  Improve balance to reduce your risk of falls.  Help you manage chronic illness with fewer medicines.  No matter how old you are, how fit you are, or what health problems you have, there is a form of activity that will work for you. And the more physical activity you can do, the better your overall health will be.  What kinds of activity can help you stay healthy?  Being more active will make your daily activities easier. Physical activity includes planned exercise and things you do in daily life. There are four types of activity:  Aerobic.  Doing aerobic activity makes your heart and lungs strong.  Includes walking, dancing, and gardening.  Aim for at least 2½ hours spread throughout the week.  It improves your energy and can help you sleep better.  Muscle-strengthening.  This type of activity can help maintain muscle and strengthen bones.  Includes climbing stairs, using resistance bands, and lifting or carrying heavy loads.  Aim for at least twice a week.  It can help protect the knees and other joints.  Stretching.  Stretching gives you better range of motion in joints and muscles.  Includes upper arm stretches, calf stretches, and gentle yoga.  Aim for at least twice a week, preferably after your muscles are warmed up from other activities.  It can help you function better in daily life.  Balancing.  This helps you stay coordinated and have good posture.  Includes heel-to-toe walking, damir chi, and certain types of yoga.  Aim for at least 3 days a week.  It can reduce your risk of falling.  Even if you have a hard time meeting the recommendations, it's better to be more active

## 2024-05-15 RX ORDER — EZETIMIBE 10 MG/1
10 TABLET ORAL DAILY
Qty: 90 TABLET | Refills: 3 | Status: SHIPPED | OUTPATIENT
Start: 2024-05-15

## 2024-06-05 RX ORDER — ROSUVASTATIN CALCIUM 10 MG/1
10 TABLET, COATED ORAL DAILY
Qty: 90 TABLET | Refills: 3 | Status: SHIPPED | OUTPATIENT
Start: 2024-06-05

## 2024-06-25 DIAGNOSIS — Z76.0 MEDICATION REFILL: ICD-10-CM

## 2024-06-25 DIAGNOSIS — F51.01 PRIMARY INSOMNIA: ICD-10-CM

## 2024-06-26 RX ORDER — ZOLPIDEM TARTRATE 10 MG/1
10 TABLET ORAL NIGHTLY PRN
Qty: 90 TABLET | Refills: 1 | Status: SHIPPED | OUTPATIENT
Start: 2024-06-26 | End: 2024-12-23

## 2024-07-17 RX ORDER — METOPROLOL TARTRATE 50 MG/1
50 TABLET, FILM COATED ORAL 2 TIMES DAILY
Qty: 180 TABLET | Refills: 3 | Status: SHIPPED | OUTPATIENT
Start: 2024-07-17

## 2024-08-07 RX ORDER — ESCITALOPRAM OXALATE 20 MG/1
20 TABLET ORAL DAILY
Qty: 90 TABLET | Refills: 3 | Status: SHIPPED | OUTPATIENT
Start: 2024-08-07

## 2024-09-27 NOTE — PROGRESS NOTES
HISTORY OF PRESENT ILLNESS      Ned Lord is a 79 y.o. male.    BP (!) 104/59 (Site: Right Upper Arm, Position: Sitting, Cuff Size: Medium Adult) Comment: w/ meds  Pulse 62   Temp 96.8 °F (36 °C) (Temporal)   Resp 17   Ht 1.753 m (5' 9\")   Wt 96.2 kg (212 lb 2 oz)   SpO2 96%   BMI 31.33 kg/m²       He has cut back on his calories    He is using an electric tricycle. He goes about 10 miles a day.    He was up to 246#. Now down to 212#    He wants to know if he can use his wife's left over Ozempic. He has tried it once in the past and lost some weight. He would like to get down another 15#        Review of Systems   Constitutional: Negative.    Respiratory: Negative.     Cardiovascular: Negative.    Neurological: Negative.            Physical Exam  Vitals and nursing note reviewed.   Constitutional:       Appearance: Normal appearance.   HENT:      Head: Normocephalic and atraumatic.   Cardiovascular:      Rate and Rhythm: Normal rate and regular rhythm.   Pulmonary:      Effort: Pulmonary effort is normal.      Breath sounds: Normal breath sounds.   Musculoskeletal:      Right lower leg: No edema.      Left lower leg: No edema.   Skin:     General: Skin is warm and dry.   Neurological:      General: No focal deficit present.      Mental Status: He is alert and oriented to person, place, and time.   Psychiatric:         Mood and Affect: Mood normal.         Behavior: Behavior normal.         ASSESSMENT and PLAN      ICD-10-CM    1. Essential (primary) hypertension  I10 Comprehensive Metabolic Panel     Lipid Panel      2. Pure hypercholesterolemia, unspecified  E78.00 Lipid Panel      3. Screening for prostate cancer  Z12.5 PSA Screening           BP controlled.    Continue same medications and doses    Chol has been controlled    Due for updated PSA    Continue current routine    OK to use left over Ozmpic if he wishes. But advised re GI side effects.    He will be getting his flu an COVID vaxes at the

## 2024-10-01 ENCOUNTER — HOSPITAL ENCOUNTER (OUTPATIENT)
Facility: HOSPITAL | Age: 79
Setting detail: SPECIMEN
Discharge: HOME | End: 2024-10-04
Payer: MEDICARE

## 2024-10-01 ENCOUNTER — OFFICE VISIT (OUTPATIENT)
Facility: CLINIC | Age: 79
End: 2024-10-01
Payer: MEDICARE

## 2024-10-01 VITALS
TEMPERATURE: 96.8 F | RESPIRATION RATE: 17 BRPM | OXYGEN SATURATION: 96 % | HEART RATE: 62 BPM | BODY MASS INDEX: 31.42 KG/M2 | SYSTOLIC BLOOD PRESSURE: 104 MMHG | DIASTOLIC BLOOD PRESSURE: 59 MMHG | WEIGHT: 212.13 LBS | HEIGHT: 69 IN

## 2024-10-01 DIAGNOSIS — E78.00 PURE HYPERCHOLESTEROLEMIA, UNSPECIFIED: ICD-10-CM

## 2024-10-01 DIAGNOSIS — Z12.5 SCREENING FOR PROSTATE CANCER: ICD-10-CM

## 2024-10-01 DIAGNOSIS — I10 ESSENTIAL (PRIMARY) HYPERTENSION: Primary | ICD-10-CM

## 2024-10-01 LAB
ALBUMIN SERPL-MCNC: 3.3 G/DL (ref 3.4–5)
ALBUMIN/GLOB SERPL: 1.4 (ref 0.8–1.7)
ALP SERPL-CCNC: 62 U/L (ref 45–117)
ALT SERPL-CCNC: 60 U/L (ref 16–61)
ANION GAP SERPL CALC-SCNC: 4 MMOL/L (ref 3–18)
AST SERPL-CCNC: 31 U/L (ref 10–38)
BILIRUB SERPL-MCNC: 0.9 MG/DL (ref 0.2–1)
BUN SERPL-MCNC: 16 MG/DL (ref 7–18)
BUN/CREAT SERPL: 17 (ref 12–20)
CALCIUM SERPL-MCNC: 8.7 MG/DL (ref 8.5–10.1)
CHLORIDE SERPL-SCNC: 107 MMOL/L (ref 100–111)
CHOLEST SERPL-MCNC: 111 MG/DL
CO2 SERPL-SCNC: 30 MMOL/L (ref 21–32)
CREAT SERPL-MCNC: 0.95 MG/DL (ref 0.6–1.3)
GLOBULIN SER CALC-MCNC: 2.3 G/DL (ref 2–4)
GLUCOSE SERPL-MCNC: 139 MG/DL (ref 74–99)
HDLC SERPL-MCNC: 47 MG/DL (ref 40–60)
HDLC SERPL: 2.4 (ref 0–5)
LDLC SERPL CALC-MCNC: 44 MG/DL (ref 0–100)
LIPID PANEL: NORMAL
POTASSIUM SERPL-SCNC: 4.5 MMOL/L (ref 3.5–5.5)
PROT SERPL-MCNC: 5.6 G/DL (ref 6.4–8.2)
PSA SERPL-MCNC: 1.6 NG/ML (ref 0–4)
SODIUM SERPL-SCNC: 141 MMOL/L (ref 136–145)
TRIGL SERPL-MCNC: 100 MG/DL
VLDLC SERPL CALC-MCNC: 20 MG/DL

## 2024-10-01 PROCEDURE — 36415 COLL VENOUS BLD VENIPUNCTURE: CPT

## 2024-10-01 PROCEDURE — G8484 FLU IMMUNIZE NO ADMIN: HCPCS | Performed by: INTERNAL MEDICINE

## 2024-10-01 PROCEDURE — G2211 COMPLEX E/M VISIT ADD ON: HCPCS | Performed by: INTERNAL MEDICINE

## 2024-10-01 PROCEDURE — 99214 OFFICE O/P EST MOD 30 MIN: CPT | Performed by: INTERNAL MEDICINE

## 2024-10-01 PROCEDURE — 80053 COMPREHEN METABOLIC PANEL: CPT

## 2024-10-01 PROCEDURE — 80061 LIPID PANEL: CPT

## 2024-10-01 PROCEDURE — G8427 DOCREV CUR MEDS BY ELIG CLIN: HCPCS | Performed by: INTERNAL MEDICINE

## 2024-10-01 PROCEDURE — 1123F ACP DISCUSS/DSCN MKR DOCD: CPT | Performed by: INTERNAL MEDICINE

## 2024-10-01 PROCEDURE — G8417 CALC BMI ABV UP PARAM F/U: HCPCS | Performed by: INTERNAL MEDICINE

## 2024-10-01 PROCEDURE — 3078F DIAST BP <80 MM HG: CPT | Performed by: INTERNAL MEDICINE

## 2024-10-01 PROCEDURE — 1036F TOBACCO NON-USER: CPT | Performed by: INTERNAL MEDICINE

## 2024-10-01 PROCEDURE — G0103 PSA SCREENING: HCPCS

## 2024-10-01 PROCEDURE — 3074F SYST BP LT 130 MM HG: CPT | Performed by: INTERNAL MEDICINE

## 2024-10-01 SDOH — ECONOMIC STABILITY: FOOD INSECURITY: WITHIN THE PAST 12 MONTHS, YOU WORRIED THAT YOUR FOOD WOULD RUN OUT BEFORE YOU GOT MONEY TO BUY MORE.: NEVER TRUE

## 2024-10-01 SDOH — ECONOMIC STABILITY: INCOME INSECURITY: HOW HARD IS IT FOR YOU TO PAY FOR THE VERY BASICS LIKE FOOD, HOUSING, MEDICAL CARE, AND HEATING?: NOT HARD AT ALL

## 2024-10-01 SDOH — ECONOMIC STABILITY: FOOD INSECURITY: WITHIN THE PAST 12 MONTHS, THE FOOD YOU BOUGHT JUST DIDN'T LAST AND YOU DIDN'T HAVE MONEY TO GET MORE.: NEVER TRUE

## 2024-10-01 ASSESSMENT — PATIENT HEALTH QUESTIONNAIRE - PHQ9
1. LITTLE INTEREST OR PLEASURE IN DOING THINGS: NOT AT ALL
5. POOR APPETITE OR OVEREATING: NOT AT ALL
7. TROUBLE CONCENTRATING ON THINGS, SUCH AS READING THE NEWSPAPER OR WATCHING TELEVISION: NOT AT ALL
2. FEELING DOWN, DEPRESSED OR HOPELESS: NOT AT ALL
10. IF YOU CHECKED OFF ANY PROBLEMS, HOW DIFFICULT HAVE THESE PROBLEMS MADE IT FOR YOU TO DO YOUR WORK, TAKE CARE OF THINGS AT HOME, OR GET ALONG WITH OTHER PEOPLE: NOT DIFFICULT AT ALL
9. THOUGHTS THAT YOU WOULD BE BETTER OFF DEAD, OR OF HURTING YOURSELF: NOT AT ALL
8. MOVING OR SPEAKING SO SLOWLY THAT OTHER PEOPLE COULD HAVE NOTICED. OR THE OPPOSITE, BEING SO FIGETY OR RESTLESS THAT YOU HAVE BEEN MOVING AROUND A LOT MORE THAN USUAL: NOT AT ALL
SUM OF ALL RESPONSES TO PHQ QUESTIONS 1-9: 0
SUM OF ALL RESPONSES TO PHQ9 QUESTIONS 1 & 2: 0
SUM OF ALL RESPONSES TO PHQ QUESTIONS 1-9: 0
6. FEELING BAD ABOUT YOURSELF - OR THAT YOU ARE A FAILURE OR HAVE LET YOURSELF OR YOUR FAMILY DOWN: NOT AT ALL
3. TROUBLE FALLING OR STAYING ASLEEP: NOT AT ALL
4. FEELING TIRED OR HAVING LITTLE ENERGY: NOT AT ALL

## 2024-10-01 ASSESSMENT — ENCOUNTER SYMPTOMS: RESPIRATORY NEGATIVE: 1

## 2024-10-01 NOTE — PROGRESS NOTES
\"Have you been to the ER, urgent care clinic since your last visit?  Hospitalized since your last visit?\"    NO    “Have you seen or consulted any other health care providers outside our system since your last visit?”    YES - When: approximately 1 months ago.  Where and Why: Dermatology for a skin check.

## 2024-12-18 DIAGNOSIS — F51.01 PRIMARY INSOMNIA: ICD-10-CM

## 2024-12-18 DIAGNOSIS — Z76.0 MEDICATION REFILL: ICD-10-CM

## 2024-12-19 RX ORDER — ZOLPIDEM TARTRATE 10 MG/1
10 TABLET ORAL NIGHTLY PRN
Qty: 90 TABLET | Refills: 1 | Status: SHIPPED | OUTPATIENT
Start: 2024-12-19 | End: 2025-06-17

## 2024-12-19 NOTE — TELEPHONE ENCOUNTER
Last Appointment:  10/1/2024  Future Appointments   Date Time Provider Department Center   4/1/2025  1:30 PM Jessie Zimmer MD [facility]

## 2025-03-22 DIAGNOSIS — N40.1 BENIGN PROSTATIC HYPERPLASIA WITH LOWER URINARY TRACT SYMPTOMS, SYMPTOM DETAILS UNSPECIFIED: ICD-10-CM

## 2025-03-22 DIAGNOSIS — Z76.0 MEDICATION REFILL: ICD-10-CM

## 2025-03-23 RX ORDER — TAMSULOSIN HYDROCHLORIDE 0.4 MG/1
0.4 CAPSULE ORAL DAILY
Qty: 90 CAPSULE | Refills: 3 | Status: SHIPPED | OUTPATIENT
Start: 2025-03-23

## 2025-03-31 SDOH — ECONOMIC STABILITY: FOOD INSECURITY: WITHIN THE PAST 12 MONTHS, THE FOOD YOU BOUGHT JUST DIDN'T LAST AND YOU DIDN'T HAVE MONEY TO GET MORE.: NEVER TRUE

## 2025-03-31 SDOH — HEALTH STABILITY: PHYSICAL HEALTH: ON AVERAGE, HOW MANY DAYS PER WEEK DO YOU ENGAGE IN MODERATE TO STRENUOUS EXERCISE (LIKE A BRISK WALK)?: 5 DAYS

## 2025-03-31 SDOH — HEALTH STABILITY: PHYSICAL HEALTH: ON AVERAGE, HOW MANY MINUTES DO YOU ENGAGE IN EXERCISE AT THIS LEVEL?: 60 MIN

## 2025-03-31 SDOH — ECONOMIC STABILITY: FOOD INSECURITY: WITHIN THE PAST 12 MONTHS, YOU WORRIED THAT YOUR FOOD WOULD RUN OUT BEFORE YOU GOT MONEY TO BUY MORE.: NEVER TRUE

## 2025-03-31 SDOH — ECONOMIC STABILITY: TRANSPORTATION INSECURITY
IN THE PAST 12 MONTHS, HAS LACK OF TRANSPORTATION KEPT YOU FROM MEETINGS, WORK, OR FROM GETTING THINGS NEEDED FOR DAILY LIVING?: NO

## 2025-03-31 SDOH — ECONOMIC STABILITY: INCOME INSECURITY: IN THE LAST 12 MONTHS, WAS THERE A TIME WHEN YOU WERE NOT ABLE TO PAY THE MORTGAGE OR RENT ON TIME?: NO

## 2025-03-31 ASSESSMENT — PATIENT HEALTH QUESTIONNAIRE - PHQ9
SUM OF ALL RESPONSES TO PHQ QUESTIONS 1-9: 0
1. LITTLE INTEREST OR PLEASURE IN DOING THINGS: NOT AT ALL
SUM OF ALL RESPONSES TO PHQ QUESTIONS 1-9: 0
2. FEELING DOWN, DEPRESSED OR HOPELESS: NOT AT ALL
SUM OF ALL RESPONSES TO PHQ QUESTIONS 1-9: 0
SUM OF ALL RESPONSES TO PHQ QUESTIONS 1-9: 0

## 2025-03-31 ASSESSMENT — LIFESTYLE VARIABLES
HOW OFTEN DO YOU HAVE A DRINK CONTAINING ALCOHOL: NEVER
HOW OFTEN DO YOU HAVE A DRINK CONTAINING ALCOHOL: 1
HOW MANY STANDARD DRINKS CONTAINING ALCOHOL DO YOU HAVE ON A TYPICAL DAY: 0
HOW MANY STANDARD DRINKS CONTAINING ALCOHOL DO YOU HAVE ON A TYPICAL DAY: PATIENT DOES NOT DRINK
HOW OFTEN DO YOU HAVE SIX OR MORE DRINKS ON ONE OCCASION: 1

## 2025-04-01 ENCOUNTER — HOSPITAL ENCOUNTER (OUTPATIENT)
Facility: HOSPITAL | Age: 80
Setting detail: SPECIMEN
Discharge: HOME | End: 2025-04-04
Payer: MEDICARE

## 2025-04-01 ENCOUNTER — OFFICE VISIT (OUTPATIENT)
Facility: CLINIC | Age: 80
End: 2025-04-01

## 2025-04-01 VITALS
TEMPERATURE: 97 F | OXYGEN SATURATION: 95 % | WEIGHT: 222 LBS | HEIGHT: 69 IN | HEART RATE: 69 BPM | SYSTOLIC BLOOD PRESSURE: 110 MMHG | DIASTOLIC BLOOD PRESSURE: 63 MMHG | BODY MASS INDEX: 32.88 KG/M2 | RESPIRATION RATE: 14 BRPM

## 2025-04-01 DIAGNOSIS — Z00.00 MEDICARE ANNUAL WELLNESS VISIT, SUBSEQUENT: Primary | ICD-10-CM

## 2025-04-01 DIAGNOSIS — E78.00 PURE HYPERCHOLESTEROLEMIA, UNSPECIFIED: ICD-10-CM

## 2025-04-01 DIAGNOSIS — I10 ESSENTIAL (PRIMARY) HYPERTENSION: ICD-10-CM

## 2025-04-01 DIAGNOSIS — R73.9 HYPERGLYCEMIA, UNSPECIFIED: ICD-10-CM

## 2025-04-01 LAB
ALBUMIN SERPL-MCNC: 3.3 G/DL (ref 3.4–5)
ALBUMIN/GLOB SERPL: 1.1 (ref 0.8–1.7)
ALP SERPL-CCNC: 69 U/L (ref 45–117)
ALT SERPL-CCNC: 20 U/L (ref 16–61)
ANION GAP SERPL CALC-SCNC: 3 MMOL/L (ref 3–18)
AST SERPL-CCNC: 17 U/L (ref 10–38)
BILIRUB SERPL-MCNC: 1.2 MG/DL (ref 0.2–1)
BUN SERPL-MCNC: 15 MG/DL (ref 7–18)
BUN/CREAT SERPL: 14 (ref 12–20)
CALCIUM SERPL-MCNC: 8.9 MG/DL (ref 8.5–10.1)
CHLORIDE SERPL-SCNC: 108 MMOL/L (ref 100–111)
CHOLEST SERPL-MCNC: 109 MG/DL
CO2 SERPL-SCNC: 29 MMOL/L (ref 21–32)
CREAT SERPL-MCNC: 1.04 MG/DL (ref 0.6–1.3)
EST. AVERAGE GLUCOSE BLD GHB EST-MCNC: 103 MG/DL
GLOBULIN SER CALC-MCNC: 2.9 G/DL (ref 2–4)
GLUCOSE SERPL-MCNC: 105 MG/DL (ref 74–99)
HBA1C MFR BLD: 5.2 % (ref 4.2–5.6)
HDLC SERPL-MCNC: 51 MG/DL (ref 40–60)
HDLC SERPL: 2.1 (ref 0–5)
LDLC SERPL CALC-MCNC: 39.8 MG/DL (ref 0–100)
LIPID PANEL: NORMAL
POTASSIUM SERPL-SCNC: 4.9 MMOL/L (ref 3.5–5.5)
PROT SERPL-MCNC: 6.2 G/DL (ref 6.4–8.2)
SODIUM SERPL-SCNC: 140 MMOL/L (ref 136–145)
TRIGL SERPL-MCNC: 91 MG/DL
VLDLC SERPL CALC-MCNC: 18.2 MG/DL

## 2025-04-01 PROCEDURE — 36415 COLL VENOUS BLD VENIPUNCTURE: CPT

## 2025-04-01 PROCEDURE — 83036 HEMOGLOBIN GLYCOSYLATED A1C: CPT

## 2025-04-01 PROCEDURE — 80053 COMPREHEN METABOLIC PANEL: CPT

## 2025-04-01 PROCEDURE — 80061 LIPID PANEL: CPT

## 2025-04-01 SDOH — SOCIAL STABILITY: SOCIAL INSECURITY: WITHIN THE LAST YEAR, HAVE YOU BEEN AFRAID OF YOUR PARTNER OR EX-PARTNER?: NO

## 2025-04-01 SDOH — SOCIAL STABILITY: SOCIAL INSECURITY: WITHIN THE LAST YEAR, HAVE YOU BEEN HUMILIATED OR EMOTIONALLY ABUSED IN OTHER WAYS BY YOUR PARTNER OR EX-PARTNER?: NO

## 2025-04-01 SDOH — SOCIAL STABILITY: SOCIAL NETWORK: HOW OFTEN DO YOU ATTEND MEETINGS OF THE CLUBS OR ORGANIZATIONS YOU BELONG TO?: NEVER

## 2025-04-01 SDOH — SOCIAL STABILITY: SOCIAL INSECURITY
WITHIN THE LAST YEAR, HAVE YOU BEEN RAPED OR FORCED TO HAVE ANY KIND OF SEXUAL ACTIVITY BY YOUR PARTNER OR EX-PARTNER?: NO

## 2025-04-01 SDOH — SOCIAL STABILITY: SOCIAL NETWORK
DO YOU BELONG TO ANY CLUBS OR ORGANIZATIONS SUCH AS CHURCH GROUPS, UNIONS, FRATERNAL OR ATHLETIC GROUPS, OR SCHOOL GROUPS?: NO

## 2025-04-01 SDOH — SOCIAL STABILITY: SOCIAL NETWORK: HOW OFTEN DO YOU ATTEND CHURCH OR RELIGIOUS SERVICES?: NEVER

## 2025-04-01 SDOH — SOCIAL STABILITY: SOCIAL NETWORK: IN A TYPICAL WEEK, HOW MANY TIMES DO YOU TALK ON THE PHONE WITH FAMILY, FRIENDS, OR NEIGHBORS?: TWICE A WEEK

## 2025-04-01 SDOH — ECONOMIC STABILITY: FOOD INSECURITY: HOW HARD IS IT FOR YOU TO PAY FOR THE VERY BASICS LIKE FOOD, HOUSING, MEDICAL CARE, AND HEATING?: NOT HARD AT ALL

## 2025-04-01 SDOH — SOCIAL STABILITY: SOCIAL NETWORK: HOW OFTEN DO YOU GET TOGETHER WITH FRIENDS OR RELATIVES?: MORE THAN THREE TIMES A WEEK

## 2025-04-01 SDOH — SOCIAL STABILITY: SOCIAL INSECURITY: ARE YOU MARRIED, WIDOWED, DIVORCED, SEPARATED, NEVER MARRIED, OR LIVING WITH A PARTNER?: MARRIED

## 2025-04-01 SDOH — HEALTH STABILITY: MENTAL HEALTH
DO YOU FEEL STRESS - TENSE, RESTLESS, NERVOUS, OR ANXIOUS, OR UNABLE TO SLEEP AT NIGHT BECAUSE YOUR MIND IS TROUBLED ALL THE TIME - THESE DAYS?: NOT AT ALL

## 2025-04-01 ASSESSMENT — ENCOUNTER SYMPTOMS: RESPIRATORY NEGATIVE: 1

## 2025-04-01 NOTE — PROGRESS NOTES
Medicare Annual Wellness Visit    Ned Lord is here for Medicare AWV, Hypertension, and Cholesterol Problem    Assessment & Plan   Medicare annual wellness visit, subsequent       No follow-ups on file.     Subjective       Patient's complete Health Risk Assessment and screening values have been reviewed and are found in Flowsheets. The following problems were reviewed today and where indicated follow up appointments were made and/or referrals ordered.    Positive Risk Factor Screenings with Interventions:      Interventions:  See AVS for additional education material             Poor Eating Habits/Diet:  Do you eat balanced/healthy meals regularly?: (!) (Patient-Rptd) No  Interventions:  Patient declines any further evaluation or treatment    Abnormal BMI (obese):  Body mass index is 32.98 kg/m². (!) Abnormal  Interventions:  See AVS for additional education material        Dentist Screen:  Have you seen the dentist within the past year?: (!) (Patient-Rptd) No    Intervention:  Advised to schedule with their dentist     Vision Screen:  Do you have difficulty driving, watching TV, or doing any of your daily activities because of your eyesight?: (Patient-Rptd) No  Have you had an eye exam within the past year?: (!) (Patient-Rptd) No  Interventions:   Patient encouraged to make appointment with their eye specialist                    Objective   Vitals:    04/01/25 1327   BP: 110/63   BP Site: Right Upper Arm   Patient Position: Sitting   BP Cuff Size: Medium Adult   Pulse: 69   Resp: 14   Temp: 97 °F (36.1 °C)   TempSrc: Temporal   SpO2: 95%   Weight: 100.7 kg (222 lb)   Height: 1.747 m (5' 8.79\")      Body mass index is 32.98 kg/m².                No Known Allergies  Prior to Visit Medications    Medication Sig Taking? Authorizing Provider   tamsulosin (FLOMAX) 0.4 MG capsule Take 1 capsule by mouth once daily Yes Jessie Zimmer MD   zolpidem (AMBIEN) 10 MG tablet Take 1 tablet by mouth nightly as needed

## 2025-04-01 NOTE — PROGRESS NOTES
HISTORY OF PRESENT ILLNESS      Ned Lord is a 79 y.o. male.    /63 (BP Site: Right Upper Arm, Patient Position: Sitting, BP Cuff Size: Medium Adult)   Pulse 69   Temp 97 °F (36.1 °C) (Temporal)   Resp 14   Ht 1.747 m (5' 8.79\")   Wt 100.7 kg (222 lb)   SpO2 95%   BMI 32.98 kg/m²     Hypertension  This is a chronic problem. The current episode started more than 1 year ago.   Hyperlipidemia  This is a chronic problem. The current episode started more than 1 year ago.       Review of Systems   Constitutional: Negative.    Respiratory: Negative.     Cardiovascular: Negative.    Neurological: Negative.            Physical Exam  Vitals and nursing note reviewed.   Constitutional:       Appearance: Normal appearance.   HENT:      Head: Normocephalic and atraumatic.   Cardiovascular:      Rate and Rhythm: Normal rate and regular rhythm.   Pulmonary:      Effort: Pulmonary effort is normal.      Breath sounds: Normal breath sounds.   Skin:     General: Skin is warm and dry.   Neurological:      General: No focal deficit present.      Mental Status: He is alert and oriented to person, place, and time.   Psychiatric:         Mood and Affect: Mood normal.         Behavior: Behavior normal.       ASSESSMENT and PLAN      ICD-10-CM    2. Essential (primary) hypertension  I10 Comprehensive Metabolic Panel     Lipid Panel      3. Pure hypercholesterolemia, unspecified  E78.00 Lipid Panel      4. Hyperglycemia, unspecified  R73.9 Comprehensive Metabolic Panel     Hemoglobin A1C        BP controlled.   Continue same medications and doses    Cholesterol due for recheck.    Continue same medications and doses    Update lab    F/u 6 months for HTN, chol, hyperglycemia

## 2025-04-01 NOTE — PATIENT INSTRUCTIONS

## 2025-04-02 ENCOUNTER — RESULTS FOLLOW-UP (OUTPATIENT)
Facility: CLINIC | Age: 80
End: 2025-04-02

## 2025-05-10 ENCOUNTER — PATIENT MESSAGE (OUTPATIENT)
Facility: CLINIC | Age: 80
End: 2025-05-10

## 2025-05-10 DIAGNOSIS — Z76.0 MEDICATION REFILL: ICD-10-CM

## 2025-05-10 DIAGNOSIS — N40.1 BENIGN PROSTATIC HYPERPLASIA WITH LOWER URINARY TRACT SYMPTOMS, SYMPTOM DETAILS UNSPECIFIED: ICD-10-CM

## 2025-05-11 RX ORDER — EZETIMIBE 10 MG/1
10 TABLET ORAL DAILY
Qty: 90 TABLET | Refills: 3 | Status: SHIPPED | OUTPATIENT
Start: 2025-05-11

## 2025-05-11 RX ORDER — ROSUVASTATIN CALCIUM 10 MG/1
10 TABLET, COATED ORAL DAILY
Qty: 90 TABLET | Refills: 3 | Status: SHIPPED | OUTPATIENT
Start: 2025-05-11

## 2025-05-12 RX ORDER — TAMSULOSIN HYDROCHLORIDE 0.4 MG/1
0.8 CAPSULE ORAL DAILY
Qty: 180 CAPSULE | Refills: 3 | Status: SHIPPED | OUTPATIENT
Start: 2025-05-12

## 2025-05-12 NOTE — TELEPHONE ENCOUNTER
Orders Placed This Encounter    tamsulosin (FLOMAX) 0.4 MG capsule     Sig: Take 2 capsules by mouth daily     Dispense:  180 capsule     Refill:  3

## 2025-06-09 DIAGNOSIS — F51.01 PRIMARY INSOMNIA: ICD-10-CM

## 2025-06-09 DIAGNOSIS — Z76.0 MEDICATION REFILL: ICD-10-CM

## 2025-06-09 RX ORDER — ZOLPIDEM TARTRATE 10 MG/1
10 TABLET ORAL NIGHTLY PRN
Qty: 90 TABLET | Refills: 1 | Status: SHIPPED | OUTPATIENT
Start: 2025-06-09 | End: 2025-12-06

## 2025-06-12 RX ORDER — EZETIMIBE 10 MG/1
10 TABLET ORAL DAILY
Qty: 90 TABLET | Refills: 3 | Status: SHIPPED | OUTPATIENT
Start: 2025-06-12

## 2025-07-07 RX ORDER — METOPROLOL TARTRATE 50 MG
50 TABLET ORAL 2 TIMES DAILY
Qty: 180 TABLET | Refills: 3 | Status: SHIPPED | OUTPATIENT
Start: 2025-07-07

## 2025-08-11 RX ORDER — ESCITALOPRAM OXALATE 20 MG/1
20 TABLET ORAL DAILY
Qty: 90 TABLET | Refills: 3 | Status: SHIPPED | OUTPATIENT
Start: 2025-08-11